# Patient Record
Sex: FEMALE | Race: WHITE | NOT HISPANIC OR LATINO | Employment: FULL TIME | URBAN - METROPOLITAN AREA
[De-identification: names, ages, dates, MRNs, and addresses within clinical notes are randomized per-mention and may not be internally consistent; named-entity substitution may affect disease eponyms.]

---

## 2021-03-03 ENCOUNTER — HOSPITAL ENCOUNTER (OUTPATIENT)
Dept: LAB | Facility: MEDICAL CENTER | Age: 21
End: 2021-03-03
Attending: PHYSICIAN ASSISTANT
Payer: COMMERCIAL

## 2021-03-03 LAB — B-HCG SERPL-ACNC: 3375 MIU/ML (ref 0–5)

## 2021-03-03 PROCEDURE — 36415 COLL VENOUS BLD VENIPUNCTURE: CPT

## 2021-03-03 PROCEDURE — 84702 CHORIONIC GONADOTROPIN TEST: CPT

## 2021-07-08 ENCOUNTER — OFFICE VISIT (OUTPATIENT)
Dept: URGENT CARE | Facility: CLINIC | Age: 21
End: 2021-07-08
Payer: COMMERCIAL

## 2021-07-08 VITALS
TEMPERATURE: 97.9 F | SYSTOLIC BLOOD PRESSURE: 140 MMHG | HEIGHT: 70 IN | HEART RATE: 113 BPM | WEIGHT: 288.1 LBS | BODY MASS INDEX: 41.24 KG/M2 | RESPIRATION RATE: 16 BRPM | DIASTOLIC BLOOD PRESSURE: 78 MMHG | OXYGEN SATURATION: 98 %

## 2021-07-08 DIAGNOSIS — H60.502 ACUTE OTITIS EXTERNA OF LEFT EAR, UNSPECIFIED TYPE: ICD-10-CM

## 2021-07-08 PROBLEM — T83.32XA IUD MIGRATION: Status: ACTIVE | Noted: 2021-03-10

## 2021-07-08 PROBLEM — T83.32XA IUD STRINGS LOST: Status: ACTIVE | Noted: 2021-03-10

## 2021-07-08 PROBLEM — J30.9 ALLERGIC RHINITIS: Status: ACTIVE | Noted: 2021-07-08

## 2021-07-08 PROBLEM — S39.012A STRAIN OF LUMBAR REGION: Status: ACTIVE | Noted: 2019-02-06

## 2021-07-08 PROBLEM — M25.551 RIGHT HIP PAIN: Status: ACTIVE | Noted: 2019-04-24

## 2021-07-08 PROBLEM — S39.012A SACROILIAC STRAIN: Status: ACTIVE | Noted: 2019-02-06

## 2021-07-08 PROCEDURE — 99203 OFFICE O/P NEW LOW 30 MIN: CPT | Performed by: PHYSICIAN ASSISTANT

## 2021-07-08 RX ORDER — COVID-19 MOLECULAR TEST ASSAY
KIT MISCELLANEOUS
COMMUNITY
Start: 2021-02-09 | End: 2021-07-08

## 2021-07-08 RX ORDER — ACETAMINOPHEN AND CODEINE PHOSPHATE 120; 12 MG/5ML; MG/5ML
SOLUTION ORAL
COMMUNITY
Start: 2021-03-10 | End: 2021-07-08

## 2021-07-08 RX ORDER — ACETAMINOPHEN AND CODEINE PHOSPHATE 120; 12 MG/5ML; MG/5ML
0.35 SOLUTION ORAL DAILY
COMMUNITY
Start: 2021-03-10 | End: 2021-07-08

## 2021-07-08 RX ORDER — IBUPROFEN 800 MG/1
800 TABLET ORAL
COMMUNITY
End: 2021-07-08

## 2021-07-08 RX ORDER — BENZONATATE 100 MG/1
100 CAPSULE ORAL
COMMUNITY
Start: 2021-07-06 | End: 2021-07-13

## 2021-07-08 RX ORDER — ESCITALOPRAM OXALATE 20 MG/1
20 TABLET ORAL
COMMUNITY
Start: 2021-05-10 | End: 2021-07-08

## 2021-07-08 RX ORDER — CIPROFLOXACIN AND DEXAMETHASONE 3; 1 MG/ML; MG/ML
SUSPENSION/ DROPS AURICULAR (OTIC)
Qty: 7.5 ML | Refills: 0 | Status: SHIPPED | OUTPATIENT
Start: 2021-07-08 | End: 2021-07-08

## 2021-07-08 RX ORDER — CIPROFLOXACIN AND DEXAMETHASONE 3; 1 MG/ML; MG/ML
SUSPENSION/ DROPS AURICULAR (OTIC)
Qty: 7.5 ML | Refills: 0 | Status: SHIPPED | OUTPATIENT
Start: 2021-07-08 | End: 2021-09-06

## 2021-07-08 RX ORDER — AMOXICILLIN AND CLAVULANATE POTASSIUM 875; 125 MG/1; MG/1
1 TABLET, FILM COATED ORAL
COMMUNITY
Start: 2021-07-06 | End: 2021-09-06

## 2021-07-08 RX ORDER — GUAIFENESIN, PSEUDOEPHEDRINE HYDROCHLORIDE 600; 60 MG/1; MG/1
1 TABLET, EXTENDED RELEASE ORAL
COMMUNITY
Start: 2021-07-06 | End: 2021-07-08

## 2021-07-08 RX ORDER — AMOXICILLIN AND CLAVULANATE POTASSIUM 875; 125 MG/1; MG/1
1 TABLET, FILM COATED ORAL
COMMUNITY
Start: 2021-07-06 | End: 2021-07-08

## 2021-07-08 RX ORDER — BENZONATATE 100 MG/1
CAPSULE ORAL
COMMUNITY
Start: 2021-07-06 | End: 2021-07-08

## 2021-07-08 RX ORDER — ESCITALOPRAM OXALATE 10 MG/1
TABLET ORAL
COMMUNITY
Start: 2021-04-13 | End: 2021-07-08

## 2021-07-08 RX ORDER — GUAIFENESIN AND PSEUDOEPHEDRINE HYDROCHLORIDE 600; 60 MG/1; MG/1
TABLET, EXTENDED RELEASE ORAL
COMMUNITY
Start: 2021-07-06 | End: 2021-09-06

## 2021-07-08 RX ORDER — ESCITALOPRAM OXALATE 20 MG/1
20 TABLET ORAL
COMMUNITY
End: 2021-09-06

## 2021-07-08 ASSESSMENT — ENCOUNTER SYMPTOMS
FEVER: 0
SINUS PAIN: 0
CHILLS: 0
RESPIRATORY NEGATIVE: 1
SORE THROAT: 0
CARDIOVASCULAR NEGATIVE: 1
ABDOMINAL PAIN: 0
DIARRHEA: 0

## 2021-07-08 NOTE — PROGRESS NOTES
"Subjective:   Dilan Pérez is a 20 y.o. female who presents for Otalgia (L ear, X 1 week, down into jaw) and Cough (green phlem X 1 week)      HPI  Patient presents the clinic with complaints of left ear pain onset 1 week ago.  Recently had sinus issues and was seen by telemedicine 2 days ago.  She was placed on Augmentin.  Her sinus issues have resolved and her cough has improved.  She primarily complains about her left ear pain. Associated mild clear drainage. The ear pain is severe.  Ear pain is worse with laying on her left side and chewing.  Denies any fever, chills, chest pain, SOB, abdominal pain, diarrhea. History of ear infection. Was in hot tub 2 days ago.       Review of Systems   Constitutional: Negative for chills and fever.   HENT: Positive for ear discharge and ear pain. Negative for congestion, sinus pain and sore throat.    Respiratory: Negative.    Cardiovascular: Negative.    Gastrointestinal: Negative for abdominal pain and diarrhea.       Medications:    • amoxicillin-clavulanate Tabs  • benzonatate Caps  • escitalopram  • escitalopram Tabs  • ibuprofen Tabs  • ID Now COVID-19 Kit  • Mucinex D Tb12  • norethindrone  • pseudoephedrine-guaifenesin    Allergies: Patient has no known allergies.    Problem List: Dilan Pérez does not have a problem list on file.    Surgical History:  No past surgical history on file.    Past Social Hx: Dilan Pérez  reports that she has never smoked. She has never used smokeless tobacco. She reports that she does not drink alcohol and does not use drugs.     Past Family Hx:  Dilan Pérez family history is not on file.     Problem list, medications, and allergies reviewed by myself today in Epic.     Objective:     /78 (BP Location: Left arm, Patient Position: Sitting, BP Cuff Size: Adult)   Pulse (!) 113   Temp 36.6 °C (97.9 °F) (Temporal)   Resp 16   Ht 1.778 m (5' 10\")   Wt (!) 131 kg (288 lb 1.6 oz)   LMP 07/03/2021   " SpO2 98%   BMI 41.34 kg/m²     Physical Exam  Vitals reviewed.   Constitutional:       General: She is not in acute distress.     Appearance: Normal appearance. She is not ill-appearing or toxic-appearing.   HENT:      Right Ear: Tympanic membrane, ear canal and external ear normal. No mastoid tenderness.      Left Ear: No mastoid tenderness.      Ears:      Comments: Left ear canal edematous, mildly erythematous, mild drainage.  Positive tragal tenderness     Mouth/Throat:      Mouth: Mucous membranes are moist.      Pharynx: No oropharyngeal exudate or posterior oropharyngeal erythema.   Eyes:      Conjunctiva/sclera: Conjunctivae normal.      Pupils: Pupils are equal, round, and reactive to light.   Cardiovascular:      Rate and Rhythm: Normal rate and regular rhythm.      Heart sounds: Normal heart sounds.   Pulmonary:      Effort: Pulmonary effort is normal. No respiratory distress.      Breath sounds: Normal breath sounds. No wheezing, rhonchi or rales.   Musculoskeletal:      Cervical back: Neck supple.   Lymphadenopathy:      Cervical: No cervical adenopathy.   Skin:     General: Skin is warm and dry.   Neurological:      General: No focal deficit present.      Mental Status: She is alert and oriented to person, place, and time.   Psychiatric:         Mood and Affect: Mood normal.         Behavior: Behavior normal.         Assessment/Associated Orders     1. Acute otitis externa of left ear, unspecified type  ciprofloxacin/dexamethasone (CIPRODEX) 0.3-0.1 % Suspension    DISCONTINUED: ciprofloxacin/dexamethasone (CIPRODEX) 0.3-0.1 % Suspension       Medical Decision Making      Discussed with patient signs and symptoms are consistent with left otitis externa.  Discussed treatment of antibiotic eardrops.  Ibuprofen for pain.  Avoid exacerbating factors. Avoid pools and hot tubs. AVS printed.     Return if symptoms not improving in the next few days or any worsening.    I personally reviewed prior external  notes and test results pertinent to today's visit. Supportive care, natural history, differential diagnoses, and indications for immediate follow-up discussed. Patient expresses understanding and agrees to plan. Patient denies any other questions or concerns.     Advised the patient to follow-up with the primary care physician for recheck, reevaluation, and consideration of further management.    Please note that this dictation was created using voice recognition software. I have made a reasonable attempt to correct obvious errors, but I expect that there are errors of grammar and possibly content that I did not discover before finalizing the note.    This note was electronically signed by Vladislav Rice PA-C

## 2021-07-09 ENCOUNTER — HOSPITAL ENCOUNTER (EMERGENCY)
Facility: MEDICAL CENTER | Age: 21
End: 2021-07-10
Payer: COMMERCIAL

## 2021-07-09 ENCOUNTER — TELEPHONE (OUTPATIENT)
Dept: URGENT CARE | Facility: PHYSICIAN GROUP | Age: 21
End: 2021-07-09

## 2021-07-09 ENCOUNTER — TELEPHONE (OUTPATIENT)
Dept: URGENT CARE | Facility: CLINIC | Age: 21
End: 2021-07-09

## 2021-07-09 PROCEDURE — 302449 STATCHG TRIAGE ONLY (STATISTIC)

## 2021-07-09 NOTE — TELEPHONE ENCOUNTER
Good afternoon,  Patient just called and stated that she is still in a lot of pain and she is not able to sleep. She said that she has been doing everything you have told her and nothing seems to help. She is asking for a different prescription or if there is something different she can do

## 2021-07-10 VITALS
DIASTOLIC BLOOD PRESSURE: 91 MMHG | OXYGEN SATURATION: 97 % | SYSTOLIC BLOOD PRESSURE: 150 MMHG | HEART RATE: 108 BPM | RESPIRATION RATE: 18 BRPM | BODY MASS INDEX: 41.66 KG/M2 | TEMPERATURE: 98.6 F | HEIGHT: 70 IN | WEIGHT: 291.01 LBS

## 2021-07-10 NOTE — ED TRIAGE NOTES
Chief Complaint   Patient presents with   • Ear Pain     left ear pain x6 days; pt saw [facility] 4 days ago and was put on antibiotics (Augmentin); pt went to  last night and was given drops as well     Pt ambulatory to triage with boyfriend for above complaint. Pt states that she was having drainage from her left ear with some blood in it last night, which is why she went to . Staff there told her that if it isn't better by today then she needed to come to the ER. Pain has actually gotten worse.     Pt is alert/oriented and follows commands. Pt speaking in full sentences and responds appropriately to questions. No acute distress noted in triage and respirations are even and unlabored.     Pt placed in lobby and educated on triage process. Pt and boyfriend encouraged to alert staff for any changes in condition.

## 2021-07-10 NOTE — TELEPHONE ENCOUNTER
Spoke to the patient on the phone addressing her concerns. She has continued severe ear pain. Recommended ibuprofen 800 mg p.o. every 8 hours as needed for severe pain. She may apply ice application. Discussed antibiotic eardrops need more time to take effect. Continue eardrops as prescribed. Avoid sleeping laying on affected ear. Return to the urgent care if no improvement or any worsening. Patient verbalized understanding agreed to plan of care.

## 2021-09-06 ENCOUNTER — OFFICE VISIT (OUTPATIENT)
Dept: URGENT CARE | Facility: PHYSICIAN GROUP | Age: 21
End: 2021-09-06
Payer: COMMERCIAL

## 2021-09-06 ENCOUNTER — HOSPITAL ENCOUNTER (OUTPATIENT)
Facility: MEDICAL CENTER | Age: 21
End: 2021-09-06
Attending: NURSE PRACTITIONER
Payer: COMMERCIAL

## 2021-09-06 VITALS
SYSTOLIC BLOOD PRESSURE: 116 MMHG | BODY MASS INDEX: 35.79 KG/M2 | RESPIRATION RATE: 16 BRPM | OXYGEN SATURATION: 100 % | HEART RATE: 88 BPM | WEIGHT: 250 LBS | HEIGHT: 70 IN | DIASTOLIC BLOOD PRESSURE: 66 MMHG | TEMPERATURE: 97.4 F

## 2021-09-06 DIAGNOSIS — R05.9 COUGH: ICD-10-CM

## 2021-09-06 DIAGNOSIS — R51.9 ACUTE NONINTRACTABLE HEADACHE, UNSPECIFIED HEADACHE TYPE: ICD-10-CM

## 2021-09-06 DIAGNOSIS — J02.9 SORE THROAT: ICD-10-CM

## 2021-09-06 LAB
INT CON NEG: NEGATIVE
INT CON POS: POSITIVE
S PYO AG THROAT QL: NORMAL

## 2021-09-06 PROCEDURE — 87880 STREP A ASSAY W/OPTIC: CPT | Performed by: NURSE PRACTITIONER

## 2021-09-06 PROCEDURE — U0005 INFEC AGEN DETEC AMPLI PROBE: HCPCS

## 2021-09-06 PROCEDURE — U0003 INFECTIOUS AGENT DETECTION BY NUCLEIC ACID (DNA OR RNA); SEVERE ACUTE RESPIRATORY SYNDROME CORONAVIRUS 2 (SARS-COV-2) (CORONAVIRUS DISEASE [COVID-19]), AMPLIFIED PROBE TECHNIQUE, MAKING USE OF HIGH THROUGHPUT TECHNOLOGIES AS DESCRIBED BY CMS-2020-01-R: HCPCS

## 2021-09-06 PROCEDURE — 99213 OFFICE O/P EST LOW 20 MIN: CPT | Performed by: NURSE PRACTITIONER

## 2021-09-06 NOTE — LETTER
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  A concern for COVID-19 was identified and testing was done.  We are asking that you excuse work absences while following the self-isolation protocol per the Center for Disease Control (CDC) guidelines.  Your employee is able to access the test results through [facility]'s electronic delivery system called [employer].   If the results of testing were negative, and once there has been no fever(temperature greater than 100.4 °F) for at least 48 hours without taking any acetaminophen or ibuprofen, and no vomiting or diarrhea for at least 48 hours, then return to work is approved without restrictions.  If the results of the testing were positive follow the the CDC guidelines with a minimum of 10 days from the onset of symptoms and without fever, vomiting, or diarrhea.    In general repeat testing is not necessary and not offered through [facility].  Repeat testing is also not recommended by the CDC.  This is the only note that will be provided from the On license of [facility] for this illness.       HEAVEN Owens  Electronically Signed

## 2021-09-07 DIAGNOSIS — R05.9 COUGH: ICD-10-CM

## 2021-09-07 DIAGNOSIS — J02.9 SORE THROAT: ICD-10-CM

## 2021-09-07 DIAGNOSIS — R51.9 ACUTE NONINTRACTABLE HEADACHE, UNSPECIFIED HEADACHE TYPE: ICD-10-CM

## 2021-09-07 LAB
COVID ORDER STATUS COVID19: NORMAL
SARS-COV-2 RNA RESP QL NAA+PROBE: NOTDETECTED
SPECIMEN SOURCE: NORMAL

## 2021-09-14 ENCOUNTER — NON-PROVIDER VISIT (OUTPATIENT)
Dept: MEDICAL GROUP | Facility: PHYSICIAN GROUP | Age: 21
End: 2021-09-14

## 2021-09-14 DIAGNOSIS — Z11.1 PPD SCREENING TEST: ICD-10-CM

## 2021-09-14 PROCEDURE — 86580 TB INTRADERMAL TEST: CPT | Performed by: NURSE PRACTITIONER

## 2021-09-14 NOTE — NON-PROVIDER
Dilan Pérez is a 21 y.o. female here for a non-provider visit for PPD placement -- Step 1 of 1    Reason for PPD:  work requirement    1. TB evaluation questionnaire completed by patient? Yes      -  If any answers marked yes did you contact a provider prior to placing? No  2.  Patient notified to return to clinic for reading on: 9/16  3.  PPD Placement documentation completed on TB evaluation questionnaire? Yes  4.  Location of TB evaluation questionnaire filed: front office

## 2021-09-15 ASSESSMENT — LIFESTYLE VARIABLES: SUBSTANCE_ABUSE: 0

## 2021-09-15 ASSESSMENT — ENCOUNTER SYMPTOMS
COUGH: 1
FEVER: 1
TINGLING: 0
DIZZINESS: 0
CHILLS: 0
SORE THROAT: 1
NAUSEA: 0
MYALGIAS: 0
HEADACHES: 1
PALPITATIONS: 0
SINUS PAIN: 0
SENSORY CHANGE: 0

## 2021-09-15 NOTE — PROGRESS NOTES
Dilan Pérez is a 21 y.o. female who presents for Pharyngitis (x1 week ) and Cough (Cough, fever, chills stuffy nose, sore throat)      HPI This is a new problem.  Dilan is a 21-year-old female presents with sore throat and cough for 1 week.  Associated symptoms include fever, chills, stuffy nose, sore throat, fatigue.  Her appetite is less than normal.  She has no change in her taste or smell.  She has no known exposure to persons with Covid or strep infection.  She is vaccinated against COVID-19 viral illness.  She reports that due to her symptoms she went to be tested for possible Covid infection.  Her headache is generalized.  It is tolerable with Tylenol and ibuprofen.  She has no history of migraine headaches.  She denies focal weakness or vision change.  No other aggravating or alleviating factors.    Review of Systems   Constitutional: Positive for fever (subjective). Negative for chills and malaise/fatigue.   HENT: Positive for congestion and sore throat. Negative for ear pain and sinus pain.    Respiratory: Positive for cough.    Cardiovascular: Negative for chest pain and palpitations.   Gastrointestinal: Negative for nausea.   Musculoskeletal: Negative for myalgias.   Neurological: Positive for headaches. Negative for dizziness, tingling and sensory change.   Psychiatric/Behavioral: Negative for substance abuse.       No Known Allergies  Past Medical History:   Diagnosis Date   • Anxiety     on meds   • Depression     on meds     History reviewed. No pertinent surgical history.  History reviewed. No pertinent family history.  Social History     Tobacco Use   • Smoking status: Never Smoker   • Smokeless tobacco: Never Used   Substance Use Topics   • Alcohol use: Never     Patient Active Problem List   Diagnosis   • Allergic rhinitis   • IUD migration   • IUD strings lost   • Right hip pain   • Sacroiliac strain   • Strain of lumbar region     No current outpatient medications on file prior to  "visit.     No current facility-administered medications on file prior to visit.          Objective:     /66 (BP Location: Left arm, Patient Position: Sitting, BP Cuff Size: Adult)   Pulse 88   Temp 36.3 °C (97.4 °F) (Temporal)   Resp 16   Ht 1.778 m (5' 10\")   Wt 113 kg (250 lb)   SpO2 100%   BMI 35.87 kg/m²     Physical Exam  Vitals and nursing note reviewed.   Constitutional:       General: She is not in acute distress.     Appearance: Normal appearance. She is well-developed and normal weight. She is not ill-appearing or toxic-appearing.   HENT:      Head: Normocephalic.      Right Ear: Hearing, ear canal and external ear normal. No middle ear effusion. Tympanic membrane is injected. Tympanic membrane is not erythematous.      Left Ear: Hearing, ear canal and external ear normal.  No middle ear effusion. Tympanic membrane is injected. Tympanic membrane is not erythematous.      Nose: Rhinorrhea present. No mucosal edema.      Right Sinus: No maxillary sinus tenderness or frontal sinus tenderness.      Left Sinus: No maxillary sinus tenderness or frontal sinus tenderness.      Mouth/Throat:      Pharynx: Uvula midline. Posterior oropharyngeal erythema present. No oropharyngeal exudate.      Tonsils: No tonsillar abscesses.   Eyes:      Pupils: Pupils are equal, round, and reactive to light.   Neck:      Trachea: Trachea normal.   Cardiovascular:      Rate and Rhythm: Normal rate and regular rhythm.      Chest Wall: PMI is not displaced.      Pulses: Normal pulses.   Pulmonary:      Effort: Pulmonary effort is normal. No respiratory distress.      Breath sounds: Normal breath sounds. No decreased breath sounds, wheezing, rhonchi or rales.   Abdominal:      Palpations: Abdomen is soft.      Tenderness: There is no abdominal tenderness.   Musculoskeletal:         General: Normal range of motion.      Cervical back: Full passive range of motion without pain, normal range of motion and neck supple. "   Lymphadenopathy:      Cervical: No cervical adenopathy.      Upper Body:      Right upper body: No supraclavicular adenopathy.      Left upper body: No supraclavicular adenopathy.   Skin:     General: Skin is warm and dry.      Capillary Refill: Capillary refill takes less than 2 seconds.   Neurological:      Mental Status: She is alert and oriented to person, place, and time.      Gait: Gait normal.   Psychiatric:         Mood and Affect: Mood normal.         Speech: Speech normal.         Behavior: Behavior normal.         Thought Content: Thought content normal.     Rapid Strep A : negative      Assessment /Associated Orders:      1. Sore throat  POCT Rapid Strep A    SARS-CoV-2 PCR (24 hour In-House): Collect NP swab in VTM   2. Cough  POCT Rapid Strep A    SARS-CoV-2 PCR (24 hour In-House): Collect NP swab in VTM   3. Acute nonintractable headache, unspecified headache type  POCT Rapid Strep A    SARS-CoV-2 PCR (24 hour In-House): Collect NP swab in VTM       Medical Decision Making:    Pt is clinically stable at today's acute urgent care visit.  No acute distress noted. Appropriate for outpatient management at this time.     COVID PCR testing - pending- (results to be released to [facility] if available)   Self Quarantine per CDC guidelines  Educated in infection control practices.   Discussed that this illness was most likely viral in nature. Did not see any evidence of a bacterial process.   OTC  analgesic/ antipyretic of choice ( acetaminophen or NSAID). Follow manufactures dosing and safety precautions.   OTC medications for symptomatic relief of symptoms  Keep well hydrated  Increase rest  Salt water gargles BID and prn. Suggested 1/4 to 1/2 teaspoon (1.5 to 3.0 g) of salt per one cup (8 ounces or 250 mL) of warm water.   OTC throat analgesic spray or lozenge of choice prn throat pain. Dosage and directions per         Advised to follow-up with the primary care provider  for recheck,  reevaluation, and consideration of further management if necessary.   Discussed management options (risks,benefits, and alternatives to treatment). Pt/ caregiver expressed understanding and the treatment plan was agreed upon. Questions were encouraged and answered     Return to urgent care prn if new or worsening sx or if there is no improvement in condition prn . Red flags discussed and indications to immediately call 911 or present to the Emergency Department.     I personally reviewed prior external notes and test results pertinent to today's visit.  I have independently reviewed and interpreted all diagnostics ordered during this urgent care acute visit.   Time spent evaluating this patient was at least 30 minutes and includes preparing for visit, counseling/education, exam and evaluation, obtaining history, independent interpretation, ordering lab/test/procedures,medication management and documentation.This does not include time spent on separate billable procedures.           Please note that this dictation was created using voice recognition software. I have made a reasonable attempt to correct obvious errors, but I expect that there are errors of grammar and possibly content that I did not discover before finalizing the note.    This note was electronically signed by Jessica QUEZADA, Urgent Care

## 2021-09-17 ENCOUNTER — NON-PROVIDER VISIT (OUTPATIENT)
Dept: MEDICAL GROUP | Facility: PHYSICIAN GROUP | Age: 21
End: 2021-09-17

## 2021-09-17 LAB — TB WHEAL 3D P 5 TU DIAM: 0 MM

## 2021-09-17 NOTE — PROGRESS NOTES
Dilan Pérez is a 21 y.o. female here for a non-provider visit for PPD reading -- Step 1 of 1.      1.  Resulted in Epic under enter/edit results? Yes   2.  TB evaluation questionnaire scanned into chart and original given to patient?Yes      3. Was induration greater than 0 mm? No.

## 2022-03-22 ENCOUNTER — OFFICE VISIT (OUTPATIENT)
Dept: URGENT CARE | Facility: PHYSICIAN GROUP | Age: 22
End: 2022-03-22
Payer: COMMERCIAL

## 2022-03-22 VITALS
OXYGEN SATURATION: 98 % | SYSTOLIC BLOOD PRESSURE: 136 MMHG | TEMPERATURE: 97.7 F | WEIGHT: 266 LBS | HEART RATE: 92 BPM | HEIGHT: 69 IN | RESPIRATION RATE: 20 BRPM | BODY MASS INDEX: 39.4 KG/M2 | DIASTOLIC BLOOD PRESSURE: 82 MMHG

## 2022-03-22 DIAGNOSIS — N92.6 LATE MENSES: ICD-10-CM

## 2022-03-22 LAB
APPEARANCE UR: NORMAL
BILIRUB UR STRIP-MCNC: NEGATIVE MG/DL
COLOR UR AUTO: YELLOW
GLUCOSE UR STRIP.AUTO-MCNC: NEGATIVE MG/DL
INT CON NEG: NEGATIVE
INT CON POS: POSITIVE
KETONES UR STRIP.AUTO-MCNC: NORMAL MG/DL
LEUKOCYTE ESTERASE UR QL STRIP.AUTO: NEGATIVE
NITRITE UR QL STRIP.AUTO: NEGATIVE
PH UR STRIP.AUTO: 5.5 [PH] (ref 5–8)
POC URINE PREGNANCY TEST: NEGATIVE
PROT UR QL STRIP: NEGATIVE MG/DL
RBC UR QL AUTO: NORMAL
SP GR UR STRIP.AUTO: 1.02
UROBILINOGEN UR STRIP-MCNC: 0.2 MG/DL

## 2022-03-22 PROCEDURE — 99213 OFFICE O/P EST LOW 20 MIN: CPT | Performed by: FAMILY MEDICINE

## 2022-03-22 PROCEDURE — 81002 URINALYSIS NONAUTO W/O SCOPE: CPT | Performed by: FAMILY MEDICINE

## 2022-03-22 PROCEDURE — 81025 URINE PREGNANCY TEST: CPT | Performed by: FAMILY MEDICINE

## 2022-03-22 NOTE — PROGRESS NOTES
"  Subjective:      21 y.o. female presents to urgent care for late menstruation. She was supposed to begin her menstrual cycle 3/20/2022.  Although she does admit to having an irregular cycle.  She is currently sexually active with one, male partner, and they use condoms fairly consistently.  She did have a copper IUD in at one point, but became pregnant on it, which she unfortunately miscarried.  She did note some brown discharge this morning, which typically occurs towards the end of her period.  She is actively trying to lose weight and is down 30 pounds since January.     She denies any other questions or concerns at this time.    Current problem list, medication, and past medical/surgical history were reviewed in Epic.    ROS  See HPI     Objective:      /82 (BP Location: Left arm, Patient Position: Sitting, BP Cuff Size: Adult long)   Pulse 92   Temp 36.5 °C (97.7 °F) (Temporal)   Resp 20   Ht 1.753 m (5' 9\")   Wt 121 kg (266 lb)   SpO2 98%   BMI 39.28 kg/m²     Physical Exam  Constitutional:       General: She is not in acute distress.     Appearance: She is not diaphoretic.   Cardiovascular:      Rate and Rhythm: Normal rate and regular rhythm.      Heart sounds: Normal heart sounds.   Pulmonary:      Effort: Pulmonary effort is normal. No respiratory distress.      Breath sounds: Normal breath sounds.   Abdominal:      General: Bowel sounds are normal.      Palpations: Abdomen is soft.      Tenderness: There is no abdominal tenderness. There is no right CVA tenderness or left CVA tenderness.   Neurological:      Mental Status: She is alert.   Psychiatric:         Mood and Affect: Affect normal.         Judgment: Judgment normal.       Assessment/Plan:     1. Late menses  hCG negative.  No sign of infection on urinalysis.  Brown discharge is likely the beginning of her menstruation.  She is aware that weight loss can make her menstruation irregular.  - POCT Pregnancy  - POCT " Urinalysis      Instructed to return to Urgent Care or nearest Emergency Department if symptoms fail to improve, for any change in condition, further concerns, or new concerning symptoms. Patient states understanding of the plan of care and discharge instructions.    Mona Trujillo M.D.

## 2022-03-22 NOTE — LETTER
March 22, 2022    To Whom It May Concern:         This is confirmation that Dilan Pérez attended her scheduled appointment with Mona Trujillo M.D. on 3/22/22.  She may return 3/24/2022 without any restrictions.         If you have any questions please do not hesitate to call me at the phone number listed below.    Sincerely,          Mona Trujillo M.D.  [phone]

## 2022-07-27 ENCOUNTER — HOSPITAL ENCOUNTER (OUTPATIENT)
Facility: MEDICAL CENTER | Age: 22
End: 2022-07-27
Attending: PHYSICIAN ASSISTANT
Payer: COMMERCIAL

## 2022-07-27 ENCOUNTER — OFFICE VISIT (OUTPATIENT)
Dept: URGENT CARE | Facility: PHYSICIAN GROUP | Age: 22
End: 2022-07-27
Payer: COMMERCIAL

## 2022-07-27 VITALS
HEIGHT: 70 IN | TEMPERATURE: 98.4 F | BODY MASS INDEX: 36.36 KG/M2 | RESPIRATION RATE: 16 BRPM | OXYGEN SATURATION: 98 % | HEART RATE: 108 BPM | DIASTOLIC BLOOD PRESSURE: 60 MMHG | WEIGHT: 254 LBS | SYSTOLIC BLOOD PRESSURE: 118 MMHG

## 2022-07-27 DIAGNOSIS — J02.9 SORE THROAT: ICD-10-CM

## 2022-07-27 DIAGNOSIS — Z20.828 EXPOSURE TO THE FLU: ICD-10-CM

## 2022-07-27 DIAGNOSIS — B34.9 NONSPECIFIC SYNDROME SUGGESTIVE OF VIRAL ILLNESS: ICD-10-CM

## 2022-07-27 LAB
EXTERNAL QUALITY CONTROL: NORMAL
FLUAV+FLUBV AG SPEC QL IA: NEGATIVE
INT CON NEG: NORMAL
INT CON NEG: NORMAL
INT CON POS: NORMAL
INT CON POS: NORMAL
S PYO AG THROAT QL: NEGATIVE
SARS-COV+SARS-COV-2 AG RESP QL IA.RAPID: NEGATIVE

## 2022-07-27 PROCEDURE — U0005 INFEC AGEN DETEC AMPLI PROBE: HCPCS

## 2022-07-27 PROCEDURE — U0003 INFECTIOUS AGENT DETECTION BY NUCLEIC ACID (DNA OR RNA); SEVERE ACUTE RESPIRATORY SYNDROME CORONAVIRUS 2 (SARS-COV-2) (CORONAVIRUS DISEASE [COVID-19]), AMPLIFIED PROBE TECHNIQUE, MAKING USE OF HIGH THROUGHPUT TECHNOLOGIES AS DESCRIBED BY CMS-2020-01-R: HCPCS

## 2022-07-27 PROCEDURE — 99214 OFFICE O/P EST MOD 30 MIN: CPT | Mod: CS | Performed by: PHYSICIAN ASSISTANT

## 2022-07-27 PROCEDURE — 87804 INFLUENZA ASSAY W/OPTIC: CPT | Performed by: PHYSICIAN ASSISTANT

## 2022-07-27 PROCEDURE — 87426 SARSCOV CORONAVIRUS AG IA: CPT | Performed by: PHYSICIAN ASSISTANT

## 2022-07-27 PROCEDURE — 87880 STREP A ASSAY W/OPTIC: CPT | Performed by: PHYSICIAN ASSISTANT

## 2022-07-27 RX ORDER — DEXAMETHASONE SODIUM PHOSPHATE 10 MG/ML
10 INJECTION INTRAMUSCULAR; INTRAVENOUS ONCE
Status: COMPLETED | OUTPATIENT
Start: 2022-07-27 | End: 2022-07-27

## 2022-07-27 RX ADMIN — DEXAMETHASONE SODIUM PHOSPHATE 10 MG: 10 INJECTION INTRAMUSCULAR; INTRAVENOUS at 12:27

## 2022-07-27 ASSESSMENT — ENCOUNTER SYMPTOMS
SHORTNESS OF BREATH: 0
DIARRHEA: 1
HOARSE VOICE: 1
COUGH: 1
VOMITING: 0
SWOLLEN GLANDS: 1
HEADACHES: 1

## 2022-07-27 NOTE — PROGRESS NOTES
"Subjective:   Dilan Pérez is a 21 y.o. female who presents for Pharyngitis (Runny nose, cough, headache x3 days )        Pharyngitis   This is a new problem. Episode onset: 3 days. The problem has been gradually worsening. The maximum temperature recorded prior to her arrival was 100.4 - 100.9 F. The fever has been present for 1 to 2 days. The pain is moderate. Associated symptoms include congestion, coughing, diarrhea (loose stools), headaches, a hoarse voice and swollen glands. Pertinent negatives include no shortness of breath or vomiting. Associated symptoms comments: Pain with swallowing, body aches. She has had no exposure to strep or mono. 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 Treatments tried: sudafed. The treatment provided no relief.     Review of Systems   HENT: Positive for congestion and hoarse voice.    Respiratory: Positive for cough. Negative for shortness of breath.    Gastrointestinal: Positive for diarrhea (loose stools). Negative for vomiting.   Neurological: Positive for headaches.       PMH:  has a past medical history of Anxiety and Depression.  MEDS: No current outpatient medications on file.    Current Facility-Administered Medications:   •  dexamethasone (DECADRON) injection (check route below) 10 mg, 10 mg, Oral, Once, MENDOZA Green.A.-TYESHA.  ALLERGIES: No Known Allergies  SURGHX: No past surgical history on file.  SOCHX:  reports that she has never smoked. She has never used smokeless tobacco. She reports that she does not drink alcohol and does not use drugs.  FH: Family history was reviewed, no pertinent findings to report   Objective:   /60   Pulse (!) 108   Temp 36.9 °C (98.4 °F) (Temporal)   Resp 16   Ht 1.778 m (5' 10\")   Wt 115 kg (254 lb)   SpO2 98%   BMI 36.45 kg/m²   Physical Exam  Vitals reviewed.   Constitutional:       General: She is not in acute distress.     Appearance: Normal appearance. She is well-developed. She is not toxic-appearing.   HENT:      " Head: Normocephalic and atraumatic.      Right Ear: Tympanic membrane, ear canal and external ear normal.      Left Ear: Tympanic membrane, ear canal and external ear normal.      Nose: Congestion and rhinorrhea present. Rhinorrhea is clear.      Mouth/Throat:      Lips: Pink.      Mouth: Mucous membranes are moist.      Pharynx: Oropharynx is clear. Uvula midline. Posterior oropharyngeal erythema present.   Cardiovascular:      Rate and Rhythm: Normal rate and regular rhythm.      Heart sounds: Normal heart sounds, S1 normal and S2 normal.   Pulmonary:      Effort: Pulmonary effort is normal. No respiratory distress.      Breath sounds: Normal breath sounds. No stridor. No decreased breath sounds, wheezing, rhonchi or rales.   Lymphadenopathy:      Cervical: Cervical adenopathy present.      Right cervical: Superficial cervical adenopathy present.      Left cervical: Superficial cervical adenopathy present.   Skin:     General: Skin is dry.   Neurological:      Comments: Alert and oriented.    Psychiatric:         Speech: Speech normal.         Behavior: Behavior normal.           Assessment/Plan:   1. Nonspecific syndrome suggestive of viral illness  - SARS-CoV-2 PCR (24 hour In-House): Collect NP swab in VTM; Future    2. Exposure to the flu  - POCT SARS-COV Antigen MEGGAN (Symptomatic only)  - POCT Influenza A/B  - POCT Rapid Strep A    3. Sore throat  - POCT SARS-COV Antigen MEGGAN (Symptomatic only)  - POCT Influenza A/B  - POCT Rapid Strep A  - dexamethasone (DECADRON) injection (check route below) 10 mg      POC Strep, Covid 19, and influenza testing are negative. Discussed with patient signs and symptoms most likely are due to a viral etiology.  As a precaution we will send out for COVID-19 testing via PCR.  Patient was instructed to quarantine in accordance with CDC guidelines.  Single dose of Decadron administered in clinic for sore throat.    Symptomatic and supportive care:   Plenty of oral hydration and rest    Over the counter cough suppressant as directed.  Tylenol or ibuprofen for pain and fever as directed.   Saline nasal spray and Flonase as a decongestant.     Return to clinic for reevaluation with any new or worsening symptoms.    Differential diagnosis, natural history, supportive care, and indications for immediate follow-up discussed.    My total time spent caring for the patient on the day of the encounter was 32 minutes.   This does not include time spent on separately billable procedures/tests.

## 2022-07-27 NOTE — LETTER
July 27, 2022    To Whom It May Concern:         This is confirmation that Dilan Pérez attended her scheduled appointment with Peewee Moreira P.A.-C. on 7/27/22.  He is excuse her from work on 7/27 through 7/29/2022.         If you have any questions please do not hesitate to call me at the phone number listed below.    Sincerely,          Peewee Moreira P.A.-C.  [phone]

## 2022-07-28 DIAGNOSIS — B34.9 NONSPECIFIC SYNDROME SUGGESTIVE OF VIRAL ILLNESS: ICD-10-CM

## 2022-07-28 LAB
AMBIGUOUS DTTM AMBI4: NORMAL
COVID ORDER STATUS COVID19: NORMAL
SARS-COV-2 RNA RESP QL NAA+PROBE: NOTDETECTED
SPECIMEN SOURCE: NORMAL

## 2022-08-03 ENCOUNTER — OFFICE VISIT (OUTPATIENT)
Dept: URGENT CARE | Facility: PHYSICIAN GROUP | Age: 22
End: 2022-08-03
Payer: COMMERCIAL

## 2022-08-03 VITALS
WEIGHT: 250 LBS | TEMPERATURE: 97.2 F | HEIGHT: 70 IN | OXYGEN SATURATION: 98 % | RESPIRATION RATE: 18 BRPM | BODY MASS INDEX: 35.79 KG/M2 | DIASTOLIC BLOOD PRESSURE: 60 MMHG | HEART RATE: 98 BPM | SYSTOLIC BLOOD PRESSURE: 128 MMHG

## 2022-08-03 DIAGNOSIS — J02.9 PHARYNGITIS, UNSPECIFIED ETIOLOGY: ICD-10-CM

## 2022-08-03 DIAGNOSIS — R13.10 ODYNOPHAGIA: ICD-10-CM

## 2022-08-03 DIAGNOSIS — J30.89 ALLERGIC RHINITIS DUE TO OTHER ALLERGIC TRIGGER, UNSPECIFIED SEASONALITY: ICD-10-CM

## 2022-08-03 PROCEDURE — 99213 OFFICE O/P EST LOW 20 MIN: CPT | Performed by: FAMILY MEDICINE

## 2022-08-03 RX ORDER — DEXAMETHASONE 4 MG/1
10 TABLET ORAL ONCE
Qty: 3 TABLET | Refills: 0 | Status: SHIPPED | OUTPATIENT
Start: 2022-08-03 | End: 2022-08-03

## 2022-08-03 ASSESSMENT — ENCOUNTER SYMPTOMS
MYALGIAS: 0
DIZZINESS: 0
SHORTNESS OF BREATH: 0
NAUSEA: 0
FEVER: 0
CHILLS: 0
VOMITING: 0
SINUS PAIN: 1
COUGH: 0
SORE THROAT: 0
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
## 2022-08-03 NOTE — PATIENT INSTRUCTIONS
Allergic Rhinitis, Adult  Allergic rhinitis is a reaction to allergens in the air. Allergens are tiny specks (particles) in the air that cause your body to have an allergic reaction. This condition cannot be passed from person to person (is not contagious). Allergic rhinitis cannot be cured, but it can be controlled.  There are two types of allergic rhinitis:  Seasonal. This type is also called hay fever. It happens only during certain times of the year.  Perennial. This type can happen at any time of the year.  What are the causes?  This condition may be caused by:  Pollen from grasses, trees, and weeds.  House dust mites.  Pet dander.  Mold.  What are the signs or symptoms?  Symptoms of this condition include:  Sneezing.  Runny or stuffy nose (nasal congestion).  A lot of mucus in the back of the throat (postnasal drip).  Itchy nose.  Tearing of the eyes.  Trouble sleeping.  Being sleepy during day.  How is this treated?  There is no cure for this condition. You should avoid things that trigger your symptoms (allergens). Treatment can help to relieve symptoms. This may include:  Medicines that block allergy symptoms, such as antihistamines. These may be given as a shot, nasal spray, or pill.  Shots that are given until your body becomes less sensitive to the allergen (desensitization).  Stronger medicines, if all other treatments have not worked.  Follow these instructions at home:  Avoiding allergens    Find out what you are allergic to. Common allergens include smoke, dust, and pollen.  Avoid them if you can. These are some of the things that you can do to avoid allergens:  Replace carpet with wood, tile, or vinyl maurisio. Carpet can trap dander and dust.  Clean any mold found in the home.  Do not smoke. Do not allow smoking in your home.  Change your heating and air conditioning filter at least once a month.  During allergy season:  Keep windows closed as much as you can. If possible, use air conditioning when  there is a lot of pollen in the air.  Use a special filter for allergies with your furnace and air conditioner.  Plan outdoor activities when pollen counts are lowest. This is usually during the early morning or evening hours.  If you do go outdoors when pollen count is high, wear a special mask for people with allergies.  When you come indoors, take a shower and change your clothes before sitting on furniture or bedding.  General instructions  Do not use fans in your home.  Do not hang clothes outside to dry.  Wear sunglasses to keep pollen out of your eyes.  Wash your hands right away after you touch household pets.  Take over-the-counter and prescription medicines only as told by your doctor.  Keep all follow-up visits as told by your doctor. This is important.  Contact a doctor if:  You have a fever.  You have a cough that does not go away (is persistent).  You start to make whistling sounds when you breathe (wheeze).  Your symptoms do not get better with treatment.  You have thick fluid coming from your nose.  You start to have nosebleeds.  Get help right away if:  Your tongue or your lips are swollen.  You have trouble breathing.  You feel dizzy or you feel like you are going to pass out (faint).  You have cold sweats.  Summary  Allergic rhinitis is a reaction to allergens in the air.  This condition may be caused by allergens. These include pollen, dust mites, pet dander, and mold.  Symptoms include a runny, itchy nose, sneezing, or tearing eyes. You may also have trouble sleeping or feel sleepy during the day.  Treatment includes taking medicines and avoiding allergens. You may also get shots or take stronger medicines.  Get help if you have a fever or a cough that does not stop. Get help right away if you are short of breath.  This information is not intended to replace advice given to you by your health care provider. Make sure you discuss any questions you have with your health care provider.  Document  Released: 04/18/2012 Document Revised: 04/07/2020 Document Reviewed: 07/09/2019  Elsevier Patient Education © 2020 [employer]  Pharyngitis    Pharyngitis is a sore throat (pharynx). This is when there is redness, pain, and swelling in your throat. Most of the time, this condition gets better on its own. In some cases, you may need medicine.  Follow these instructions at home:  Take over-the-counter and prescription medicines only as told by your doctor.  If you were prescribed an antibiotic medicine, take it as told by your doctor. Do not stop taking the antibiotic even if you start to feel better.  Do not give children aspirin. Aspirin has been linked to Reye syndrome.  Drink enough water and fluids to keep your pee (urine) clear or pale yellow.  Get a lot of rest.  Rinse your mouth (gargle) with a salt-water mixture 3-4 times a day or as needed. To make a salt-water mixture, completely dissolve ½-1 tsp of salt in 1 cup of warm water.  If your doctor approves, you may use throat lozenges or sprays to soothe your throat.  Contact a doctor if:  You have large, tender lumps in your neck.  You have a rash.  You cough up green, yellow-brown, or bloody spit.  Get help right away if:  You have a stiff neck.  You drool or cannot swallow liquids.  You cannot drink or take medicines without throwing up.  You have very bad pain that does not go away with medicine.  You have problems breathing, and it is not from a stuffy nose.  You have new pain and swelling in your knees, ankles, wrists, or elbows.  Summary  Pharyngitis is a sore throat (pharynx). This is when there is redness, pain, and swelling in your throat.  If you were prescribed an antibiotic medicine, take it as told by your doctor. Do not stop taking the antibiotic even if you start to feel better.  Most of the time, pharyngitis gets better on its own. Sometimes, you may need medicine.  This information is not intended to replace advice given to you by your  health care provider. Make sure you discuss any questions you have with your health care provider.  Document Released: 06/05/2009 Document Revised: 11/30/2018 Document Reviewed: 01/23/2018  Elsevier Patient Education © 2020 [employer] Inc[employer]

## 2022-12-01 ENCOUNTER — OCCUPATIONAL MEDICINE (OUTPATIENT)
Dept: URGENT CARE | Facility: PHYSICIAN GROUP | Age: 22
End: 2022-12-01
Payer: COMMERCIAL

## 2022-12-01 ENCOUNTER — HOSPITAL ENCOUNTER (OUTPATIENT)
Dept: RADIOLOGY | Facility: MEDICAL CENTER | Age: 22
End: 2022-12-01
Attending: PHYSICIAN ASSISTANT
Payer: COMMERCIAL

## 2022-12-01 VITALS
SYSTOLIC BLOOD PRESSURE: 122 MMHG | BODY MASS INDEX: 35.79 KG/M2 | WEIGHT: 250 LBS | OXYGEN SATURATION: 100 % | RESPIRATION RATE: 16 BRPM | HEART RATE: 107 BPM | TEMPERATURE: 97.7 F | DIASTOLIC BLOOD PRESSURE: 64 MMHG | HEIGHT: 70 IN

## 2022-12-01 DIAGNOSIS — M25.571 ACUTE RIGHT ANKLE PAIN: ICD-10-CM

## 2022-12-01 DIAGNOSIS — S96.911A STRAIN OF RIGHT ANKLE, INITIAL ENCOUNTER: ICD-10-CM

## 2022-12-01 PROCEDURE — 73610 X-RAY EXAM OF ANKLE: CPT | Mod: RT

## 2022-12-01 PROCEDURE — 99213 OFFICE O/P EST LOW 20 MIN: CPT | Performed by: PHYSICIAN ASSISTANT

## 2022-12-01 NOTE — LETTER
[facility] [address] [place] [address]  Phone:  [phone] - Fax:  [phone]   [facility] Progress Report and Disability Certification  Date of Service: 2022   No Show:  No  Date / Time of Next Visit: 2022   Claim Information   Patient Name: Dilan Pérez  Claim Number:     Employer:   [employer] Date of Injury: 2022     Insurer / TPA: Employers Insurance  ID / SSN:     Occupation: Teacher  Diagnosis: Diagnoses of Acute right ankle pain and Strain of right ankle, initial encounter were pertinent to this visit.    Medical Information   Related to Industrial Injury?      Subjective Complaints:  DOI 2022: Patient states she was walking and rolled her ankle on a toy and fell causing her to injure her right ankle.  Patient states for the rest of the day at work she mostly sat and did not walk or stand.  Patient states she has fractured a growth plate of her ankle but is unsure if it was her right or left ankle.   Objective Findings: Patient is able to plantarflex and dorsiflex but does have pain when doing so and also with lateral motion of the right ankle.  Tenderness to palpation with significant swelling to the lateral malleolus and medial malleolus.  No bruising appreciated the area.  Neurovascular intact distally.  Antalgic gait favoring the right side.  Right Achilles intact.   Pre-Existing Condition(s):     Assessment:   Initial Visit    Status:    Permanent Disability:     Plan:      Diagnostics: X-ray    Comments:       Disability Information   Status: Released to Restricted Duty    From:  2022  Through: 2022 Restrictions are: Temporary   Physical Restrictions   Sitting:    Standin hrs/day Stoopin hrs/day Bending:      Squattin hrs/day Walking:  < or = to 2 hrs/day Climbin hrs/day Pushing:      Pulling:    Other:    Reaching Above Shoulder (L):   Reaching Above  Shoulder (R):       Reaching Below Shoulder (L):    Reaching Below Shoulder (R):      Not to exceed Weight Limits   Carrying(hrs):   Weight Limit(lb):   Lifting(hrs):   Weight  Limit(lb):     Comments: Recommend use of rest, ice, compression elevation as well as ankle brace to the area.  If patient goes back to work tomorrow would recommend very limited duty with no standing or walking and would recommend lots of rest over the weekend and follow-up on Monday for reevaluation.  Patient provided with a lace up ankle brace and Ace wrap today.  Recommend use of over-the-counter ibuprofen or Tylenol per 's instructions if needed for pain.    Repetitive Actions   Hands: i.e. Fine Manipulations from Grasping:     Feet: i.e. Operating Foot Controls:     Driving / Operate Machinery:     Health Care Provider’s Original or Electronic Signature  Fernando Hutchinson P.A.-C. Health Care Provider’s Original or Electronic Signature    Neo Izquierdo DO MPH     Clinic Name / Location: [address] [address] Clinic Phone Number: Dept: [phone]   Appointment Time: 4:10 Pm Visit Start Time: 4:32 PM   Check-In Time:  4:09 Pm Visit Discharge Time:  5:40 PM   Original-Treating Physician or Chiropractor    Page 2-Insurer/TPA    Page 3-Employer    Page 4-Employee

## 2022-12-01 NOTE — LETTER
December 2, 2022         Patient: Dilan Pérez   YOB: 2000   Date of Visit: 12/1/2022           To Whom it May Concern:    Dilan Pérez was seen in my clinic on 12/1/2022.  Please excuse patient's absence.  She will follow-up on 12/5/2022 for reevaluation.    If you have any questions or concerns, please don't hesitate to call.        Sincerely,           Fernando Hutchinson P.A.-C.  Electronically Signed

## 2022-12-01 NOTE — LETTER
"EMPLOYEE’S CLAIM FOR COMPENSATION/ REPORT OF INITIAL TREATMENT  FORM C-4    EMPLOYEE’S CLAIM - PROVIDE ALL INFORMATION REQUESTED   First Name  Dilan Last Name  Beto Birthdate                    2000                Sex  female Claim Number (Insurer’s Use Only)    Home Address  [address] Age  22 y.o. 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 (if applicable)  Walking    How did this injury or occupational disease occur? (Be specific an answer in detail. Use additional sheet if necessary)  Walking & rolled my ankel on a toy and fell   If you believe that you have an occupational disease, when did you first have knowledge of the disability and it relationship to your employment?  N/A Witnesses to the Accident  Vilma Arrington      Nature of Injury or Occupational Disease  Workers' Compensation  Part(s) of Body Injured or Affected  Ankle (R), N/A, N/A    I certify that the above is true and correct to the best of my knowledge and that I have provided this information in order to obtain the benefits of [place] Insurance and " [place]  I hereby authorize any physician, chiropractor, surgeon, practitioner, or other person, any hospital, including [facility] or [facility], any medical service organization, any insurance company, or other institution or organization to release to each other, any medical or other information, including benefits paid or payable, pertinent to this injury or disease, except information relative to diagnosis, treatment and/or counseling for AIDS, psychological conditions, alcohol or controlled substances, for which I must give specific authorization.  A Photostat of this authorization shall be as valid as the original.     Date   Place Employee’s Original or  *Electronic Signature   THIS REPORT MUST BE COMPLETED AND MAILED WITHIN 3 WORKING DAYS OF TREATMENT   Place  [facility]  Name of Facility  [place]   Date  12/1/2022 Diagnosis and Description of Injury or Occupational Disease  (M25.571) Acute right ankle pain  (S96.911A) Strain of right ankle, initial encounter Is there evidence the injured employee was under the influence of alcohol and/or another controlled substance at the time of accident?  ? No ? Yes (if yes, please explain)    Hour  4:32 PM   Diagnoses of Acute right ankle pain and Strain of right ankle, initial encounter were pertinent to this visit. No   Treatment  Recommend use of rest, ice, compression elevation as well as ankle brace to the area.  If patient goes back to work tomorrow would recommend very limited duty with no standing or walking and would recommend lots of rest over the weekend and follow-up on Monday for reevaluation.  Patient provided with a lace up ankle brace and Ace wrap today.  Recommend use of over-the-counter ibuprofen or Tylenol per 's instructions if needed for pain.  Have you advised the patient to remain off work five days or     more?    X-Ray  "Findings  Negative   ? Yes Indicate dates:   From   To      From information given by the employee, together with medical evidence, can        you directly connect this injury or occupational disease as job incurred?  Yes ? No If no, is the injured employee capable of:  ? full duty  No ? modified duty  Yes   Is additional medical care by a physician indicated?  Yes If Modified Duty, Specify any Limitations / Restrictions      Do you know of any previous injury or disease contributing to this condition or occupational disease?  ? Yes ? No (Explain if yes)                          No   Date  12/1/2022 Print Health Care Provider's   Kevin Hutchinson P.A.-C. I certify the employer’s copy of  this form was mailed on:   Address  [address]  [facility] [facility] Insurer’s Use Only     [facility]  [address]    Provider’s Tax ID Number  [id number] Telephone  Dept: [phone]             Health Care Provider’s Original or Electronic Signature  e-KEVIN Valencia P.A.-C. Degree (MD,DO, DC,PAElizabethC,APRN)   PA-C      * Complete and attach Release of Information (Form C-4A) when injured employee signs C-4 Form electronically  ORIGINAL - TREATING HEALTHCARE PROVIDER PAGE 2 - INSURER/TPA PAGE 3 - EMPLOYER PAGE 4 - EMPLOYEE             Form C-4 (rev.08/21)           BRIEF DESCRIPTION OF RIGHTS AND BENEFITS  (Pursuant to [place] 616[place])    Notice of Injury or Occupational Disease (Incident Report Form C-1): If an injury or occupational disease (OD) arises out of and in the course of employment, you must provide written notice to your employer as soon as practicable, but no later than 7 days after the accident or OD. Your employer shall maintain a sufficient supply of the required forms.    Claim for Compensation (Form C-4): If medical treatment is sought, the form C-4 is available at the place of initial treatment. A completed \"Claim for Compensation\" (Form C-4) must be filed within 90 days after an accident or OD. The treating " physician or chiropractor must, within 3 working days after treatment, complete and mail to the employer, the employer's insurer and third-party , the Claim for Compensation.    Medical Treatment: If you require medical treatment for your on-the-job injury or OD, you may be required to select a physician or chiropractor from a list provided by your workers’ compensation insurer, if it has contracted with an Organization for Managed Care (MCO) or Preferred Provider Organization (PPO) or providers of health care. If your employer has not entered into a contract with an MCO or PPO, you may select a physician or chiropractor from the Panel of Physicians and Chiropractors. Any medical costs related to your industrial injury or OD will be paid by your insurer.    Temporary Total Disability (TTD): If your doctor has certified that you are unable to work for a period of at least 5 consecutive days, or 5 cumulative days in a 20-day period, or places restrictions on you that your employer does not accommodate, you may be entitled to TTD compensation.    Temporary Partial Disability (TPD): If the wage you receive upon reemployment is less than the compensation for TTD to which you are entitled, the insurer may be required to pay you TPD compensation to make up the difference. TPD can only be paid for a maximum of 24 months.    Permanent Partial Disability (PPD): When your medical condition is stable and there is an indication of a PPD as a result of your injury or OD, within 30 days, your insurer must arrange for an evaluation by a rating physician or chiropractor to determine the degree of your PPD. The amount of your PPD award depends on the date of injury, the results of the PPD evaluation, your age and wage.    Permanent Total Disability (PTD): If you are medically certified by a treating physician or chiropractor as permanently and totally disabled and have been granted a PTD status by your insurer, you are  entitled to receive monthly benefits not to exceed 66 2/3% of your average monthly wage. The amount of your PTD payments is subject to reduction if you previously received a lump-sum PPD award.    Vocational Rehabilitation Services: You may be eligible for vocational rehabilitation services if you are unable to return to the job due to a permanent physical impairment or permanent restrictions as a result of your injury or occupational disease.    Transportation and Per Elizabeth Reimbursement: You may be eligible for travel expenses and per elizabeth associated with medical treatment.    Reopening: You may be able to reopen your claim if your condition worsens after claim closure.     Appeal Process: If you disagree with a written determination issued by the insurer or the insurer does not respond to your request, you may appeal to the Department of Administration, , by following the instructions contained in your determination letter. You must appeal the determination within 70 days from the date of the determination letter at [address], [address], [place], [place] [address], or [address], [place] [address]. If you disagree with the  decision, you may appeal to the Department of Administration, . You must file your appeal within 30 days from the date of the  decision letter at [address], [address], [place], [place] [address], or [address], [place] [address]. If you disagree with a decision of an , you may file a petition for judicial review with the District Court. You must do so within 30 days of the Appeal Officer’s decision. You may be represented by an  at your own expense or you may contact the [facility] for possible representation.    [place]  for Injured Workers (NAIW): If you disagree with a  decision, you may request that NAIW represent  you without charge at an  Hearing. For information regarding denial of benefits, you may contact the [facility] at: [address], [address], [place] [address], [phone], or [address] [facility], [address], [place] [address], [phone]    To File a Complaint with the Division: If you wish to file a complaint with the  of the Division of Industrial Relations (DIR),  please contact the Workers’ Compensation Section, [address], [address], [place], [place] [address], telephone [phone]) [phone], or [address], [address], [place], [place] [address], telephone [phone]) [phone].    For assistance with Workers’ Compensation Issues: You may contact the [facility] Office for Consumer Health Assistance, [address], [address], [place], [place] [address], Toll Free 1-542.924.7356, Web site: http://[facility].nv.gov/Programs/JESSICA E-mail: jessica@[facility].nv.[facility]              __________________________________________________________________                                    _________________            Employee Name / Signature                                                                                                                            Date                                                                                                                                                                                                                              D-2 (rev. 10/20)

## 2022-12-02 NOTE — PROGRESS NOTES
"Subjective     Dilan Pérez is a 22 y.o. female who presents with Ankle Injury (WC NEW /Tripped on a toy and rolled R ankle, x today)      DOI 12/1/2022: Patient states she was walking and rolled her ankle on a toy and fell causing her to injure her right ankle.  Patient states for the rest of the day at work she mostly sat and did not walk or stand.  Patient states she has fractured a growth plate of her ankle but is unsure if it was her right or left ankle.     HPI  Patient presents today for evaluation of work-related injury as described above.  Review of Systems   Musculoskeletal:         Right ankle pain     PMH: No pertinent past medical history to this problem  MEDS: Medications were reviewed in Epic  ALLERGIES: Allergies were reviewed in Epic  SOCHX: Works as a teacher at [employer]  FH: No pertinent family history to this problem         Objective     /64   Pulse (!) 107   Temp 36.5 °C (97.7 °F)   Resp 16   Ht 1.778 m (5' 10\")   Wt 113 kg (250 lb)   SpO2 100%   BMI 35.87 kg/m²      Physical Exam  Vitals and nursing note reviewed.   Constitutional:       General: She is not in acute distress.     Appearance: Normal appearance. She is well-developed. She is not ill-appearing or toxic-appearing.   HENT:      Head: Normocephalic and atraumatic.      Right Ear: Hearing normal.      Left Ear: Hearing normal.   Cardiovascular:      Rate and Rhythm: Normal rate.   Pulmonary:      Effort: Pulmonary effort is normal.   Musculoskeletal:      Comments: As described below   Skin:     General: Skin is warm and dry.   Neurological:      Mental Status: She is alert.      Coordination: Coordination normal.   Psychiatric:         Mood and Affect: Mood normal.       Patient is able to plantarflex and dorsiflex but does have pain when doing so and also with lateral motion of the right ankle.  Tenderness to palpation with significant swelling to the lateral malleolus and medial malleolus.  No bruising " appreciated the area.  Neurovascular intact distally.  Antalgic gait favoring the right side.  Right Achilles intact.      DX ANKLE  FINDINGS:  There is normal bony mineralization of the right ankle. There is no evidence of fracture, dislocation, or osseous lesion. There is soft tissue swelling about the right ankle.     IMPRESSION:     1.  Soft tissue swelling about the right ankle.     2.  There is no evidence of fracture, dislocation, or osseous lesion..         Assessment & Plan   1. Acute right ankle pain    - DX-ANKLE 3+ VIEWS RIGHT; Future    2. Strain of right ankle, initial encounter    Recommend use of rest, ice, compression elevation as well as ankle brace to the area.  If patient goes back to work tomorrow would recommend very limited duty with no standing or walking and would recommend lots of rest over the weekend and follow-up on Monday for reevaluation.  Patient provided with a lace up ankle brace and Ace wrap today.  Recommend use of over-the-counter ibuprofen or Tylenol per 's instructions if needed for pain.         Please note that this dictation was created using voice recognition software. I have made every reasonable attempt to correct obvious errors, but I expect that there may be errors of grammar and possibly content that I did not discover before finalizing the note.

## 2022-12-05 ENCOUNTER — OCCUPATIONAL MEDICINE (OUTPATIENT)
Dept: URGENT CARE | Facility: PHYSICIAN GROUP | Age: 22
End: 2022-12-05
Payer: COMMERCIAL

## 2022-12-05 VITALS
DIASTOLIC BLOOD PRESSURE: 78 MMHG | OXYGEN SATURATION: 98 % | RESPIRATION RATE: 16 BRPM | BODY MASS INDEX: 35.79 KG/M2 | WEIGHT: 250 LBS | HEIGHT: 70 IN | SYSTOLIC BLOOD PRESSURE: 114 MMHG | TEMPERATURE: 98.1 F | HEART RATE: 113 BPM
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
## 2022-12-05 DIAGNOSIS — S93.401D SPRAIN OF RIGHT ANKLE, UNSPECIFIED LIGAMENT, SUBSEQUENT ENCOUNTER: ICD-10-CM

## 2022-12-05 PROBLEM — S93.401A RIGHT ANKLE SPRAIN: Status: ACTIVE | Noted: 2022-12-05

## 2022-12-05 PROCEDURE — 99213 OFFICE O/P EST LOW 20 MIN: CPT | Performed by: NURSE PRACTITIONER

## 2022-12-05 NOTE — LETTER
[facility] [address] [address]  Phone:  [phone] - Fax:  [phone]   [facility] Progress Report and Disability Certification  Date of Service: 12/5/2022   No Show:  No  Date / Time of Next Visit: 12/12/2022   Claim Information   Patient Name: Dilan Pérez  Claim Number:     Employer:    Date of Injury: 12/1/2022     Insurer / TPA: Employers Insurance  ID / SSN:     Occupation: Teacher  Diagnosis: There were no encounter diagnoses.    Medical Information   Related to Industrial Injury? Yes    Subjective Complaints:  DOI 12/1/2022: Patient states she was walking and rolled her ankle on a toy and fell causing her to injure her right ankle. Patient reports that she did go back to work for one day, and they had her sit in a chair for limited weight bearing per her restrictions. 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  Patient reports use of ice, brace and elevation, but has not had any improvement in her symptoms. She did attend a family event and after 20 minutes of walking, experienced significant pain and swelling.  She has been limited weightbearing since, with judicious use of ice, bracing and elevation. She states the brace does help her ankle feel more stable.    Objective Findings: Physical Exam  Vitals and nursing note reviewed.   Constitutional:       General: She is not in acute distress.     Appearance: Normal appearance. She is well-developed. She is not ill-appearing or toxic-appearing.   HENT:      Head: Normocephalic and atraumatic.      Right Ear: Hearing normal.      Left Ear: Hearing normal.   Cardiovascular:      Rate and Rhythm: Normal rate.   Pulmonary:      Effort: Pulmonary effort is normal.   Musculoskeletal:      Comments: As described below   Skin:     General: Skin is warm and dry.   Neurological:      Mental Status: She is alert.      Coordination:  Coordination normal.   Psychiatric:         Mood and Affect: Mood normal.         Patient is able to plantarflex and dorsiflex but does have pain when doing so and also with lateral motion of the right ankle.  Tenderness to palpation with significant swelling to the lateral malleolus and medial malleolus.  Bruising to the ankle is noted.   Neurovascular intact distally.  Antalgic gait favoring the right side.  Right Achilles intact.   Pre-Existing Condition(s):     Assessment:   Condition Same    Status: Additional Care Required  Permanent Disability:No    Plan:      Diagnostics:      Comments:       Disability Information   Status: Temporarily Totally Disabled    From:  12/5/2022  Through: 12/12/2022 Restrictions are: Temporary   Physical Restrictions   Sitting:    Standing:    Stooping:    Bending:      Squatting:    Walking:    Climbing:    Pushing:      Pulling:    Other:    Reaching Above Shoulder (L):   Reaching Above Shoulder (R):       Reaching Below Shoulder (L):    Reaching Below Shoulder (R):      Not to exceed Weight Limits   Carrying(hrs):   Weight Limit(lb):   Lifting(hrs):   Weight  Limit(lb):     Comments: Patient to be off work completely for one week.    If little to no improvement at next visit, consider referral to foot/ankle speciality and/or occupational medicine.  Recommend use of rest, ice, compression elevation as well as ankle brace to the area.    Recommend use of over-the-counter ibuprofen or Tylenol per 's instructions if needed for pain.    Repetitive Actions   Hands: i.e. Fine Manipulations from Grasping:     Feet: i.e. Operating Foot Controls:     Driving / Operate Machinery:     Health Care Provider’s Original or Electronic Signature  MAHESH Hines Health Care Provider’s Original or Electronic Signature    Neo Izquierdo DO MPH     Clinic Name / Location: [facility] [address] [address] Clinic Phone Number: Dept:  [phone]   Appointment Time: 5:00 Pm Visit Start Time: 6:04 PM   Check-In Time:  4:43 Pm Visit Discharge Time:  6:42 PM   Original-Treating Physician or Chiropractor    Page 2-Insurer/TPA    Page 3-Employer    Page 4-Employee

## 2022-12-06 NOTE — PROGRESS NOTES
"Subjective:     Dilan Pérez is a 22 y.o. female who presents for Fall (WC 1st FV/R ankle/Still swelling and pain radiating to top of foot. 6/10 pain/Initial 12/1)      DOI 12/1/2022: Patient states she was walking and rolled her ankle on a toy and fell causing her to injure her right ankle. Patient reports that she did go back to work for one day, and they had her sit in a chair for limited weight bearing per her restrictions. 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  Patient reports use of ice, brace and elevation, but has not had any improvement in her symptoms. She did attend a family event and after 20 minutes of walking, experienced significant pain and swelling.  She has been limited weightbearing since, with judicious use of ice, bracing and elevation. She states the brace does help her ankle feel more stable.     PMH:   No pertinent past medical history to this problem  MEDS:  Medications were reviewed in EMR  ALLERGIES:  Allergies were reviewed in EMR  SOCHX:  Works as a teacher  FH:   No pertinent family history to this problem       Objective:     /78 (BP Location: Left arm, Patient Position: Sitting, BP Cuff Size: Adult)   Pulse (!) 113   Temp 36.7 °C (98.1 °F) (Temporal)   Resp 16   Ht 1.778 m (5' 10\")   Wt 113 kg (250 lb)   SpO2 98%   BMI 35.87 kg/m²     Physical Exam  Vitals and nursing note reviewed.   Constitutional:       General: She is not in acute distress.     Appearance: Normal appearance. She is well-developed. She is not ill-appearing or toxic-appearing.   HENT:      Head: Normocephalic and atraumatic.      Right Ear: Hearing normal.      Left Ear: Hearing normal.   Cardiovascular:      Rate and Rhythm: Normal rate.   Pulmonary:      Effort: Pulmonary effort is normal.   Musculoskeletal:      Comments: As described below   Skin:     General: Skin is warm and dry.   Neurological:      Mental Status: She is alert.      " Coordination: Coordination normal.   Psychiatric:         Mood and Affect: Mood normal.         Patient is able to plantarflex and dorsiflex but does have pain when doing so and also with lateral motion of the right ankle.  Tenderness to palpation with significant swelling to the lateral malleolus and medial malleolus.  Bruising to the ankle is noted.   Neurovascular intact distally.  Antalgic gait favoring the right side.  Right Achilles intact.    Assessment/Plan:       1. Sprain of right ankle, unspecified ligament, subsequent encounter    2. Work related injury  Temporarily Totally Disabled FROM 12/5/2022 TO 12/12/2022  Patient to be off work completely for one week.    If little to no improvement at next visit, consider referral to foot/ankle speciality and/or occupational medicine.  Recommend use of rest, ice, compression elevation as well as ankle brace to the area.    Recommend use of over-the-counter ibuprofen or Tylenol per 's instructions if needed for pain.       Differential diagnosis, natural history, supportive care, and indications for immediate follow-up discussed.

## 2022-12-12 ENCOUNTER — OCCUPATIONAL MEDICINE (OUTPATIENT)
Dept: URGENT CARE | Facility: PHYSICIAN GROUP | Age: 22
End: 2022-12-12
Payer: COMMERCIAL

## 2022-12-12 VITALS
TEMPERATURE: 98.5 F | RESPIRATION RATE: 16 BRPM | SYSTOLIC BLOOD PRESSURE: 110 MMHG | HEIGHT: 70 IN | HEART RATE: 110 BPM | BODY MASS INDEX: 35.79 KG/M2 | OXYGEN SATURATION: 98 % | WEIGHT: 250 LBS | DIASTOLIC BLOOD PRESSURE: 60 MMHG
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
## 2022-12-12 DIAGNOSIS — S93.491A SPRAIN OF ANTERIOR TALOFIBULAR LIGAMENT OF RIGHT ANKLE, INITIAL ENCOUNTER: ICD-10-CM

## 2022-12-12 PROCEDURE — 99213 OFFICE O/P EST LOW 20 MIN: CPT | Performed by: STUDENT IN AN ORGANIZED HEALTH CARE EDUCATION/TRAINING PROGRAM

## 2022-12-12 RX ORDER — IBUPROFEN 200 MG
200 TABLET ORAL EVERY 6 HOURS PRN
COMMUNITY
End: 2023-03-20

## 2022-12-12 NOTE — LETTER
[facility] [address] [place] [address]  Phone:  [phone] - Fax:  [phone]   [facility] Progress Report and Disability Certification  Date of Service: 12/12/2022   No Show:  No  Date / Time of Next Visit: 12/28/2022   Claim Information   Patient Name: Dilan Pérez  Claim Number:     Employer:    Date of Injury: 12/1/2022     Insurer / TPA: Employers Insurance  ID / SSN:     Occupation: Teacher  Diagnosis: Diagnoses of Sprain of anterior talofibular ligament of right ankle, initial encounter and Work related injury were pertinent to this visit.    Medical Information   Related to Industrial Injury? Yes    Subjective Complaints:  DOI 12/1/2022: Patient states she was walking and rolled her ankle on a toy and fell causing her to injure her right ankle.  Patient states for the rest of the day at work she mostly sat and did not walk or stand.  Patient states she has fractured a growth plate of her ankle but is unsure if it was her right or left ankle.     Today's date: 12/12/22.  Visit #3  Overall patient feels like she is doing much better, swelling improved as has the pain.  However still having persistent discomfort along the lateral margins of the ankle aggravated with weightbearing and plantar flexion.  Seems improved with ice, elevation, Tylenol and ibuprofen.  She was given an ankle brace which has provided limited relief of symptoms.  Reports a history of likely recurrent ankle sprains, bilaterally when she used to play sports.   Objective Findings: Gen: no acute distress, normal voice  Skin: dry, intact, moist mucosal membranes  Head: Atraumatic, normocephalic  Psych: normal affect, normal judgement, alert, awake  Musculoskeletal: Right ankle: Edema along the lateral malleolus.  TTP along the ATFL with positive anterior drawer test compared to contralateral ankle.       Pre-Existing Condition(s):     Assessment:   Condition  Improved    Status: Additional Care Required  Permanent Disability:No    Plan:      Diagnostics:      Comments:       Disability Information   Status: Released to Restricted Duty    From:  12/12/2022  Through: 12/19/2022 Restrictions are: Temporary   Physical Restrictions   Sitting:    Standing:    Stooping:    Bending:      Squatting:    Walking:    Climbing:    Pushing:      Pulling:    Other:    Reaching Above Shoulder (L):   Reaching Above Shoulder (R):       Reaching Below Shoulder (L):    Reaching Below Shoulder (R):      Not to exceed Weight Limits   Carrying(hrs):   Weight Limit(lb): < or = to 10 pounds Lifting(hrs):   Weight  Limit(lb): < or = to 10 pounds   Comments: Examination consistent with ATFL tear   - Restrictions per D 39 (10 pound weight limit)  -Ice, elevation, Tylenol, ibuprofen light activity as tolerated  -Wear ankle brace while at work  -Follow-up with occupational medicine at [address] [place] [address] in 1 week    Repetitive Actions   Hands: i.e. Fine Manipulations from Grasping:     Feet: i.e. Operating Foot Controls:     Driving / Operate Machinery:     Health Care Provider’s Original or Electronic Signature  Mikal Morales D.O. Health Care Provider’s Original or Electronic Signature    Neo Izquierdo DO MPH     Clinic Name / Location: [address] [address] Clinic Phone Number: Dept: [phone]   Appointment Time: 9:00 Am Visit Start Time: 9:12 AM   Check-In Time:  8:47 Am Visit Discharge Time:  9:40 AM   Original-Treating Physician or Chiropractor    Page 2-Insurer/TPA    Page 3-Employer    Page 4-Employee

## 2022-12-12 NOTE — PROGRESS NOTES
"Subjective:     Dilan Pérez is a 22 y.o. female who presents for Ankle Injury (WC FV (R) ankle and states she is getting better, but still really sore)      DOI 12/1/2022: Patient states she was walking and rolled her ankle on a toy and fell causing her to injure her right ankle.  Patient states for the rest of the day at work she mostly sat and did not walk or stand.  Patient states she has fractured a growth plate of her ankle but is unsure if it was her right or left ankle.     Today's date: 12/12/22.  Visit #3  Overall patient feels like she is doing much better, swelling improved as has the pain.  However still having persistent discomfort along the lateral margins of the ankle aggravated with weightbearing and plantar flexion.  Seems improved with ice, elevation, Tylenol and ibuprofen.  She was given an ankle brace which has provided limited relief of symptoms.  Reports a history of likely recurrent ankle sprains, bilaterally when she used to play sports.    PMH:   No pertinent past medical history to this problem  MEDS:  Medications were reviewed in EMR  ALLERGIES:  Allergies were reviewed in EMR  FH:   No pertinent family history to this problem       Objective:     /60 (BP Location: Left arm, Patient Position: Sitting, BP Cuff Size: Large adult)   Pulse (!) 110   Temp 36.9 °C (98.5 °F) (Temporal)   Resp 16   Ht 1.778 m (5' 10\")   Wt 113 kg (250 lb)   SpO2 98%   BMI 35.87 kg/m²     Gen: no acute distress, normal voice  Skin: dry, intact, moist mucosal membranes  Head: Atraumatic, normocephalic  Psych: normal affect, normal judgement, alert, awake  Musculoskeletal: Right ankle: Edema along the lateral malleolus.  TTP along the ATFL with positive anterior drawer test compared to contralateral ankle.        Assessment/Plan:       1. Sprain of anterior talofibular ligament of right ankle, initial encounter    2. Work related injury    Other orders  - ibuprofen (MOTRIN) 200 MG Tab; Take 200 " mg by mouth every 6 hours as needed.    Released to Restricted Duty FROM 12/12/2022 TO 12/19/2022  Examination consistent with ATFL tear   - Restrictions per D 39 (10 pound weight limit)  -Ice, elevation, Tylenol, ibuprofen light activity as tolerated  -Wear ankle brace while at work  -Follow-up with occupational medicine at [address] [place] [address] in 1 week     Addendum: No availability with occupational medicine to the first week in January so we will have the patient follow-up in urgent care in 2 weeks.      Differential diagnosis, natural history, supportive care, and indications for immediate follow-up discussed.

## 2022-12-28 ENCOUNTER — OCCUPATIONAL MEDICINE (OUTPATIENT)
Dept: URGENT CARE | Facility: PHYSICIAN GROUP | Age: 22
End: 2022-12-28
Payer: COMMERCIAL

## 2022-12-28 VITALS
SYSTOLIC BLOOD PRESSURE: 128 MMHG | DIASTOLIC BLOOD PRESSURE: 76 MMHG | BODY MASS INDEX: 35.79 KG/M2 | OXYGEN SATURATION: 100 % | TEMPERATURE: 97.9 F | HEIGHT: 70 IN | RESPIRATION RATE: 16 BRPM | HEART RATE: 96 BPM | WEIGHT: 250 LBS

## 2022-12-28 DIAGNOSIS — S93.491D SPRAIN OF ANTERIOR TALOFIBULAR LIGAMENT OF RIGHT ANKLE, SUBSEQUENT ENCOUNTER: ICD-10-CM

## 2022-12-28 PROCEDURE — 99213 OFFICE O/P EST LOW 20 MIN: CPT | Performed by: PHYSICIAN ASSISTANT

## 2022-12-28 NOTE — LETTER
[facility] [address] [place] [address]  Phone:  [phone] - Fax:  [phone]   [facility] Progress Report and Disability Certification  Date of Service: 12/28/2022   No Show:  No  Date / Time of Next Visit: 1/4/2023   Claim Information   Patient Name: Dilan Pérez  Claim Number:     Employer:   [employer]  Date of Injury: 12/1/2022     Insurer / TPA: Employers Insurance  ID / SSN:     Occupation: Teacher  Diagnosis: The encounter diagnosis was Sprain of anterior talofibular ligament of right ankle, subsequent encounter.    Medical Information   Related to Industrial Injury? Yes    Subjective Complaints:  DOI 12/1/2022: This is the patient's 4th visit regarding work-related injury.  Patient states she is still using meloxicam and Tylenol as needed.  She is still using the ankle brace mostly at work and not at home.  She states she is still having some pain with certain movements especially lateral motion of the right foot.  She still feels it is unstable.   Objective Findings: Patient has some mild tenderness and mild swelling to the lateral aspect of the ankle.  No bruising appreciated.  Patient has full strength against plantarflexion and dorsiflexion.  She does have pain with lateral motion.  Neurovascular intact distally.   Pre-Existing Condition(s):     Assessment:   Condition Improved    Status: Additional Care Required  Permanent Disability:No    Plan:      Diagnostics:      Comments:       Disability Information   Status: Released to Restricted Duty    From:  12/28/2022  Through: 1/4/2023 Restrictions are: Temporary   Physical Restrictions   Sitting:    Standing:    Stooping:    Bending:      Squatting:    Walking:    Climbing:    Pushing:      Pulling:    Other:    Reaching Above Shoulder (L):   Reaching Above Shoulder (R):       Reaching Below Shoulder (L):    Reaching Below Shoulder (R):      Not to exceed  Weight Limits   Carrying(hrs):   Weight Limit(lb): < or = to 10 pounds Lifting(hrs):   Weight  Limit(lb): < or = to 10 pounds   Comments: I would recommend patient continue with no lifting over 10 pounds per her previous restrictions but at this time she can start weightbearing as tolerated without the ankle brace but if she does feel comfortable she can use the ankle brace throughout the day.  Would recommend starting on ankle strengthening and range of motion exercises.  She will follow-up in 1 week for reevaluation and possible full duty at that time.  She can continue with rest and ice and elevation as well.    Repetitive Actions   Hands: i.e. Fine Manipulations from Grasping:     Feet: i.e. Operating Foot Controls:     Driving / Operate Machinery:     Health Care Provider’s Original or Electronic Signature  Fernando Hutchinson P.A.-C. Health Care Provider’s Original or Electronic Signature    Neo Izquierdo DO MPH     Clinic Name / Location: [address] [address] Clinic Phone Number: Dept: [phone]   Appointment Time: 4:15 Pm Visit Start Time: 4:51 PM   Check-In Time:  3:57 Pm Visit Discharge Time:  5:25 PM   Original-Treating Physician or Chiropractor    Page 2-Insurer/TPA    Page 3-Employer    Page 4-Employee

## 2022-12-29 NOTE — PROGRESS NOTES
"Subjective     Dilan Pérez is a 22 y.o. female who presents with Ankle Injury (Rolled R ankle/WC 3rd FV/Still sore and does not feel stable without brace)    DOI 12/1/2022: This is the patient's 4th visit regarding work-related injury.  Patient states she is still using meloxicam and Tylenol as needed.  She is still using the ankle brace mostly at work and not at home.  She states she is still having some pain with certain movements especially lateral motion of the right foot.  She still feels it is unstable.     Patient presents today for follow-up of work-related injury as described above.    Review of Systems   Musculoskeletal:         Right ankle pain     PMH: No pertinent past medical history to this problem  MEDS: Medications were reviewed in Epic  ALLERGIES: Allergies were reviewed in Epic  SOCHX: Works as a teacher   FH: No pertinent family history to this problem         Objective     /76 (BP Location: Right arm, Patient Position: Sitting, BP Cuff Size: Large adult)   Pulse 96   Temp 36.6 °C (97.9 °F) (Temporal)   Resp 16   Ht 1.778 m (5' 10\")   Wt 113 kg (250 lb)   SpO2 100%   BMI 35.87 kg/m²      Physical Exam  Vitals and nursing note reviewed.   Constitutional:       General: She is not in acute distress.     Appearance: Normal appearance. She is well-developed. She is not ill-appearing or toxic-appearing.   HENT:      Head: Normocephalic and atraumatic.      Right Ear: Hearing normal.      Left Ear: Hearing normal.   Cardiovascular:      Rate and Rhythm: Normal rate.   Pulmonary:      Effort: Pulmonary effort is normal.   Musculoskeletal:      Comments: As described below.   Skin:     General: Skin is warm and dry.   Neurological:      Mental Status: She is alert.      Coordination: Coordination normal.   Psychiatric:         Mood and Affect: Mood normal.       Patient has some mild tenderness and mild swelling to the lateral aspect of the ankle.  No bruising appreciated.  Patient " has full strength against plantarflexion and dorsiflexion.  She does have pain with lateral motion.  Neurovascular intact distally.              Assessment & Plan   1. Sprain of anterior talofibular ligament of right ankle, subsequent encounter       I would recommend patient continue with no lifting over 10 pounds per her previous restrictions but at this time she can start weightbearing as tolerated without the ankle brace but if she does feel comfortable she can use the ankle brace throughout the day.  Would recommend starting on ankle strengthening and range of motion exercises.  She will follow-up in 1 week for reevaluation and possible full duty at that time.  She can continue with rest and ice and elevation as well.  Demonstrated exercises for patient to use for her ankle.    Please note that this dictation was created using voice recognition software. I have made every reasonable attempt to correct obvious errors, but I expect that there may be errors of grammar and possibly content that I did not discover before finalizing the note.

## 2023-01-04 ENCOUNTER — OCCUPATIONAL MEDICINE (OUTPATIENT)
Dept: URGENT CARE | Facility: PHYSICIAN GROUP | Age: 23
End: 2023-01-04
Payer: COMMERCIAL

## 2023-01-04 VITALS
SYSTOLIC BLOOD PRESSURE: 146 MMHG | TEMPERATURE: 97.8 F | WEIGHT: 262 LBS | BODY MASS INDEX: 37.51 KG/M2 | HEIGHT: 70 IN | RESPIRATION RATE: 16 BRPM | OXYGEN SATURATION: 98 % | DIASTOLIC BLOOD PRESSURE: 80 MMHG

## 2023-01-04 DIAGNOSIS — S93.491D SPRAIN OF ANTERIOR TALOFIBULAR LIGAMENT OF RIGHT ANKLE, SUBSEQUENT ENCOUNTER: ICD-10-CM

## 2023-01-04 PROCEDURE — 99212 OFFICE O/P EST SF 10 MIN: CPT | Performed by: PHYSICIAN ASSISTANT

## 2023-01-04 NOTE — PROGRESS NOTES
"Subjective:     Dilan Pérez is a 22 y.o. female who presents for Follow-Up (Right ankle injury , per patient has improved )      DOI: 12/01/2022   5th visit   Patient is here for follow up after right ankle sprain. Patient reports her pain has gone away. Currently has no pain. She is walking normally. She has been performing ankle exercises and ROM exercises. She is still on work restrictions. She believes she is ready to return to work full duty.      PMH:   No pertinent past medical history to this problem  MEDS:  Medications were reviewed in EMR  ALLERGIES:  Allergies were reviewed in EMR  SOCHX:  Works as a Teacher   FH:   No pertinent family history to this problem     Objective:     BP (!) 146/80 (BP Location: Right arm, Patient Position: Sitting, BP Cuff Size: Adult)   Temp 36.6 °C (97.8 °F) (Temporal)   Resp 16   Ht 1.778 m (5' 10\")   Wt 119 kg (262 lb)   SpO2 98%   BMI 37.59 kg/m²     Constitutional: Well-appearing in no acute distress  Musculoskeletal: Right ankle- full ROM.  No TTP.  Negative erythema, edema, crepitus or deformity.  Pulses 2+.  Sensation intact.  Normal gait    Assessment/Plan:       1. Sprain of anterior talofibular ligament of right ankle, subsequent encounter    Released to Full Duty FROM   TO       Plan:   -released to full duty   -discharge MMI     Differential diagnosis, natural history, supportive care, and indications for immediate follow-up discussed.    "

## 2023-01-04 NOTE — LETTER
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    Medical Information   Related to Industrial Injury? Yes    Subjective Complaints:  DOI: 12/01/2022   5th visit   Patient is here for follow up after right ankle sprain. Patient reports her pain has gone away. Currently has no pain. She is walking normally. She has been performing ankle exercises and ROM exercises. She is still on work restrictions. She believes she is ready to return to work full duty.     Objective Findings: Constitutional: Well-appearing in no acute distress  Musculoskeletal: Right ankle- full ROM.  No TTP.  Negative erythema, edema, crepitus or deformity.  Pulses 2+.  Sensation intact.  Normal gait   Pre-Existing Condition(s):     Assessment:   Condition Improved    Status: Discharged /  MMI  Permanent Disability:No    Plan:      Diagnostics:      Comments:  Plan:   -released to full duty   -discharge MMI     Disability Information   Status: Released to Full Duty    From:     Through:   Restrictions are:     Physical Restrictions   Sitting:    Standing:    Stooping:    Bending:      Squatting:    Walking:    Climbing:    Pushing:      Pulling:    Other:    Reaching Above Shoulder (L):   Reaching Above Shoulder (R):       Reaching Below Shoulder (L):    Reaching Below Shoulder (R):      Not to exceed Weight Limits   Carrying(hrs):   Weight Limit(lb):   Lifting(hrs):   Weight  Limit(lb):     Comments:      Repetitive  Actions   Hands: i.e. Fine Manipulations from Grasping:     Feet: i.e. Operating Foot Controls:     Driving / Operate Machinery:     Health Care Provider’s Original or Electronic Signature  Vladislav Rice P.A.-C. Health Care Provider’s Original or Electronic Signature    Neo Izquierdo DO MPH     Clinic Name / Location: [address] [address] Clinic Phone Number: Dept: [phone]   Appointment Time: 12:00 Pm Visit Start Time: 12:16 PM   Check-In Time:  12:01 Pm Visit Discharge Time:     Original-Treating Physician or Chiropractor    Page 2-Insurer/TPA    Page 3-Employer    Page 4-Employee

## 2023-01-04 NOTE — LETTER
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    Medical Information   Related to Industrial Injury? Yes    Subjective Complaints:  DOI: 12/01/2022   5th visit   Patient is here for follow up after right ankle sprain. Patient reports her pain has gone away. Currently has no pain. She is walking normally. She has been performing ankle exercises and ROM exercises. She is still on work restrictions. She believes she is ready to return to work full duty.     Objective Findings: Constitutional: Well-appearing in no acute distress  Musculoskeletal: Right ankle- full ROM.  No TTP.  Negative erythema, edema, crepitus or deformity.  Pulses 2+.  Sensation intact.  Normal gait   Pre-Existing Condition(s):     Assessment:   Condition Improved    Status: Discharged /  MMI  Permanent Disability:No    Plan:      Diagnostics:      Comments:  Plan:   -released to full duty   -discharge MMI     Disability Information   Status: Released to Full Duty    From:     Through:   Restrictions are:     Physical Restrictions   Sitting:    Standing:    Stooping:    Bending:      Squatting:    Walking:    Climbing:    Pushing:      Pulling:    Other:    Reaching Above Shoulder (L):   Reaching Above Shoulder (R):       Reaching Below Shoulder (L):    Reaching Below Shoulder (R):      Not to exceed Weight Limits   Carrying(hrs):   Weight Limit(lb):   Lifting(hrs):   Weight  Limit(lb):     Comments:       Repetitive Actions   Hands: i.e. Fine Manipulations from Grasping:     Feet: i.e. Operating Foot Controls:     Driving / Operate Machinery:     Health Care Provider’s Original or Electronic Signature  Vladislav Rice P.A.-C. Health Care Provider’s Original or Electronic Signature    Neo Izquierdo DO MPH     Clinic Name / Location: [address] [address] Clinic Phone Number: Dept: [phone]   Appointment Time: 12:00 Pm Visit Start Time: 12:16 PM   Check-In Time:  12:01 Pm Visit Discharge Time:  12:42 PM   Original-Treating Physician or Chiropractor    Page 2-Insurer/TPA    Page 3-Employer    Page 4-Employee

## 2023-02-15 ENCOUNTER — OFFICE VISIT (OUTPATIENT)
Dept: URGENT CARE | Facility: PHYSICIAN GROUP | Age: 23
End: 2023-02-15
Payer: COMMERCIAL

## 2023-02-15 ENCOUNTER — HOSPITAL ENCOUNTER (OUTPATIENT)
Facility: MEDICAL CENTER | Age: 23
End: 2023-02-15
Attending: PHYSICIAN ASSISTANT
Payer: COMMERCIAL

## 2023-02-15 VITALS
BODY MASS INDEX: 36.08 KG/M2 | HEART RATE: 94 BPM | HEIGHT: 70 IN | WEIGHT: 252 LBS | SYSTOLIC BLOOD PRESSURE: 110 MMHG | TEMPERATURE: 98 F | RESPIRATION RATE: 16 BRPM | DIASTOLIC BLOOD PRESSURE: 62 MMHG | OXYGEN SATURATION: 99 %

## 2023-02-15 DIAGNOSIS — J02.9 PHARYNGITIS, UNSPECIFIED ETIOLOGY: ICD-10-CM

## 2023-02-15 DIAGNOSIS — J02.9 SORE THROAT: ICD-10-CM

## 2023-02-15 LAB
INT CON NEG: NEGATIVE
INT CON POS: POSITIVE
S PYO AG THROAT QL: NEGATIVE

## 2023-02-15 PROCEDURE — 99213 OFFICE O/P EST LOW 20 MIN: CPT | Performed by: PHYSICIAN ASSISTANT

## 2023-02-15 PROCEDURE — 87070 CULTURE OTHR SPECIMN AEROBIC: CPT

## 2023-02-15 PROCEDURE — 87880 STREP A ASSAY W/OPTIC: CPT | Performed by: PHYSICIAN ASSISTANT

## 2023-02-15 PROCEDURE — 87077 CULTURE AEROBIC IDENTIFY: CPT

## 2023-02-15 RX ORDER — AMOXICILLIN 500 MG/1
500 CAPSULE ORAL 2 TIMES DAILY
Qty: 20 CAPSULE | Refills: 0 | Status: SHIPPED | OUTPATIENT
Start: 2023-02-15 | End: 2023-02-25

## 2023-02-15 NOTE — LETTER
February 15, 2023    To Whom It May Concern:         This is confirmation that Dilan Tonye Beto attended her scheduled appointment with Jing Larsen P.A.-C. on 2/15/23. Please excuse patient from work tomorrow.         If you have any questions please do not hesitate to call me at the phone number listed below.    Sincerely,          Jing Larsen P.A.-C.  [phone]

## 2023-02-16 DIAGNOSIS — J02.9 PHARYNGITIS, UNSPECIFIED ETIOLOGY: ICD-10-CM

## 2023-02-16 DIAGNOSIS — J02.9 SORE THROAT: ICD-10-CM

## 2023-02-16 NOTE — PROGRESS NOTES
"Subjective:   Dilan Pérez is a 22 y.o. female who presents for Sore Throat (X 5 days with fatigue, nausea, light congestion and weak)     Fiance had covid.  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  Patient presents with chief complaint of severe ST, painful to talk, neck pain, fatigue, dizziness, body aches.  No fevers, mild chills.  Reports she works in a  where there are multiple cases of strep hand-foot-and-mouth disease etc.  Symptoms began Friday night.  She does also report her fiancé had COVID a couple of weeks ago.  She has minimal cough.  No shortness of breath.  No underlying respiratory illnesses.  Appetite is decreased.        Medications:  ibuprofen Tabs  meloxicam    Allergies:             Patient has no known allergies.    Surgical History:       No past surgical history on file.    Past Social Hx:  Dilan Pérez  reports that she has never smoked. She has never used smokeless tobacco. She reports that she does not drink alcohol and does not use drugs.     Past Family Hx:   Dilan Pérez family history is not on file.       Problem list, medications, and allergies reviewed by myself today in Epic.     Objective:     /62 (BP Location: Left arm, Patient Position: Sitting, BP Cuff Size: Large adult)   Pulse 94   Temp 36.7 °C (98 °F) (Temporal)   Resp 16   Ht 1.778 m (5' 10\")   Wt 114 kg (252 lb)   SpO2 99%   BMI 36.16 kg/m²     Physical Exam  Vitals and nursing note reviewed.   Constitutional:       General: She is not in acute distress.     Appearance: Normal appearance. She is well-developed. She is not ill-appearing or diaphoretic.   HENT:      Head: Normocephalic.      Right Ear: Tympanic membrane normal.      Left Ear: Tympanic membrane normal.      Nose: Congestion and rhinorrhea present.      Mouth/Throat:      Palate: No mass.      Pharynx: Posterior oropharyngeal erythema present. No pharyngeal swelling, oropharyngeal exudate or uvula swelling.      " Tonsils: No tonsillar exudate or tonsillar abscesses. 1+ on the right. 1+ on the left.      Comments: Surgical tonsils.  Significant erythema noted to pharynx.  Mild exudate noted on right.  Eyes:      Conjunctiva/sclera: Conjunctivae normal.      Pupils: Pupils are equal, round, and reactive to light.   Cardiovascular:      Rate and Rhythm: Normal rate and regular rhythm.      Pulses: Normal pulses.      Heart sounds: No murmur heard.  Pulmonary:      Effort: Pulmonary effort is normal. No tachypnea, accessory muscle usage or respiratory distress.      Breath sounds: Normal breath sounds. No stridor. No decreased breath sounds, wheezing, rhonchi or rales.   Musculoskeletal:         General: Normal range of motion.      Cervical back: Normal range of motion and neck supple.   Skin:     General: Skin is warm and dry.   Neurological:      Mental Status: She is alert and oriented to person, place, and time.   Psychiatric:         Behavior: Behavior is cooperative.     Rapid strep negative  Assessment/Plan:     Diagnosis and Associated Orders:     1. Sore throat  - POCT Rapid Strep A  - CULTURE THROAT; Future    2. Pharyngitis, unspecified etiology  - CULTURE THROAT; Future  - amoxicillin (AMOXIL) 500 MG Cap; Take 1 Capsule by mouth 2 times a day for 10 days.  Dispense: 20 Capsule; Refill: 0        Comments/MDM:  Rapid strep negative.  We will collect throat culture and sensitivity.  High suspicion for bacterial pharyngitis given exudate, cervical lymphadenopathy, lack of significant cough.  Will treat empirically and be in touch with patient via MyChart regarding results of throat culture.  Warm salt water gargles.  Alternate tylenol and ibuprofen for pain.  Soft foods and cool liquids.  Throat lozenges as directed      I considered other causes of pharyngitis including Group C, G strep, peritonsillar abscess, Mononucleosis, karuna's angina, and retropharyngeal abscess but the patient's reported symptoms and my exam do  not support these alternative diagnosis based on information I have available today.  This may change and I encouraged the patient to return to clinic if they are experiencing new symptoms or their symptoms fail to resolve with time as we cannot rule out all pathology from a single Urgent Care visit.     The short duration of patient's symptoms and lack of constitutional symptoms made testing for Mono futile.  Since it takes 6-10 days for your body to begin to produce antibodies (which monospot tests for), there is a very high likelihood of a false negative.  I encouraged the patient to return to clinic in 1-2 weeks if they feel other constitutional symptoms that would suggest EBV/Mo      I personally reviewed prior external notes and test results pertinent to today's visit.  Red flags discussed as well as indications to present to the Emergency Department.  Supportive care, natural history, differential diagnoses, and indications for immediate follow-up discussed.  Patient expresses understanding and agrees to plan.  Patient denies any other questions or concerns.    Follow-up with the primary care physician for recheck, reevaluation, and consideration of further management.      Please note that this dictation was created using voice recognition software. I have made a reasonable attempt to correct obvious errors, but I expect that there are errors of grammar and possibly content that I did not discover before finalizing the note.    This note was electronically signed by Jing Larsen PA-C           unsure

## 2023-02-18 LAB
BACTERIA SPEC RESP CULT: ABNORMAL
BACTERIA SPEC RESP CULT: ABNORMAL
SIGNIFICANT IND 70042: ABNORMAL
SITE SITE: ABNORMAL
SOURCE SOURCE: ABNORMAL

## 2023-02-18 NOTE — RESULT ENCOUNTER NOTE
Hello,     Your throat culture results are positive for bacterial infection. The antibiotic that you were issued should cover this infection. I recommend continued use of OTC tylenol/ibuprofen and cold/cough/sinus medications as needed. If your symptoms are persistent or worsening please follow up with PCP or return to urgent care for re-evaluation. Please let me know if you have questions or concerns.     Take Care,   Jing Larsen PA-C

## 2023-03-20 ENCOUNTER — OFFICE VISIT (OUTPATIENT)
Dept: URGENT CARE | Facility: PHYSICIAN GROUP | Age: 23
End: 2023-03-20
Payer: COMMERCIAL

## 2023-03-20 VITALS
OXYGEN SATURATION: 100 % | RESPIRATION RATE: 18 BRPM | HEART RATE: 117 BPM | BODY MASS INDEX: 34.65 KG/M2 | WEIGHT: 242 LBS | SYSTOLIC BLOOD PRESSURE: 116 MMHG | DIASTOLIC BLOOD PRESSURE: 60 MMHG | TEMPERATURE: 99.1 F | HEIGHT: 70 IN

## 2023-03-20 DIAGNOSIS — J01.40 ACUTE PANSINUSITIS, RECURRENCE NOT SPECIFIED: ICD-10-CM

## 2023-03-20 PROCEDURE — 99214 OFFICE O/P EST MOD 30 MIN: CPT | Performed by: NURSE PRACTITIONER

## 2023-03-20 RX ORDER — FLUTICASONE PROPIONATE 50 MCG
2 SPRAY, SUSPENSION (ML) NASAL DAILY
Qty: 16 G | Refills: 0 | Status: SHIPPED | OUTPATIENT
Start: 2023-03-20 | End: 2023-04-03

## 2023-03-20 RX ORDER — AMOXICILLIN AND CLAVULANATE POTASSIUM 875; 125 MG/1; MG/1
1 TABLET, FILM COATED ORAL 2 TIMES DAILY
Qty: 20 TABLET | Refills: 0 | Status: SHIPPED | OUTPATIENT
Start: 2023-03-20 | End: 2023-03-30

## 2023-03-20 NOTE — LETTER
March 20, 2023       Patient: Dilan Pérez   YOB: 2000   Date of Visit: 3/20/2023         To Whom It May Concern:    In my medical opinion, I recommend that Dilan Pérez return to full duty, no restrictions on 03/21/23              Sincerely,          VALERIA OwensPVIVIENNE.  Electronically Signed

## 2023-03-20 NOTE — PROGRESS NOTES
"Dilan Pérez is a 22 y.o. female who presents for Headache (X 2 wk headache 3 days ago nausea, dizziness)      HPI  This is a new problem. Dilan Pérez is a 22 y.o. patient who presents to urgent care with c/o: headache for 2 weeks. FEeling sensitive to light. Wears contacts. Online student on computer a lot. Right side of her face feels burning.   Usually gets headach prior to onset of menses. This time her headache stayed.   Thick nasal drainage, cough and congestion. Mild ear pain. If she changes position quickly she feels light headed. Clears throat a lot.   Treatments tried: excedrin  Denies fever, body aches,   No other aggravating or alleviating factors.       ROS See HPI    Allergies:     No Known Allergies    PMSFS Hx:  Past Medical History:   Diagnosis Date    Anxiety     on meds    Depression     on meds     History reviewed. No pertinent surgical history.  History reviewed. No pertinent family history.  Social History     Tobacco Use    Smoking status: Never    Smokeless tobacco: Never   Substance Use Topics    Alcohol use: Yes     Comment: occ       Problems:   Patient Active Problem List   Diagnosis    Allergic rhinitis    IUD migration    IUD strings lost    Right hip pain    Sacroiliac strain    Strain of lumbar region    Right ankle sprain    Work related injury       Medications:   No current outpatient medications on file prior to visit.     No current facility-administered medications on file prior to visit.          Objective:     /60 (BP Location: Left arm, Patient Position: Sitting, BP Cuff Size: Adult)   Pulse (!) 117   Temp 37.3 °C (99.1 °F) (Temporal)   Resp 18   Ht 1.778 m (5' 10\")   Wt 110 kg (242 lb)   SpO2 100%   BMI 34.72 kg/m²     Physical Exam  Vitals and nursing note reviewed.   Constitutional:       General: She is not in acute distress.     Appearance: Normal appearance. She is well-developed. She is not ill-appearing.   HENT:      Head: Normocephalic. "      Right Ear: Hearing, tympanic membrane, ear canal and external ear normal.      Left Ear: Hearing, tympanic membrane, ear canal and external ear normal.      Nose: Mucosal edema and rhinorrhea present. Rhinorrhea is purulent.      Right Sinus: Maxillary sinus tenderness present. No frontal sinus tenderness.      Left Sinus: Maxillary sinus tenderness present. No frontal sinus tenderness.      Mouth/Throat:      Pharynx: Uvula midline. Oropharyngeal exudate and posterior oropharyngeal erythema present.   Eyes:      General:         Right eye: No discharge.         Left eye: No discharge.      Conjunctiva/sclera:      Right eye: Right conjunctiva is injected.      Left eye: Left conjunctiva is injected.   Neck:      Trachea: Trachea and phonation normal.   Cardiovascular:      Rate and Rhythm: Normal rate and regular rhythm.      Chest Wall: PMI is not displaced.      Pulses: Normal pulses.      Heart sounds: Normal heart sounds.   Pulmonary:      Effort: Pulmonary effort is normal.      Breath sounds: Normal breath sounds.   Musculoskeletal:      Cervical back: Full passive range of motion without pain, normal range of motion and neck supple.   Lymphadenopathy:      Head:      Right side of head: No tonsillar adenopathy.      Left side of head: No tonsillar adenopathy.      Cervical: No cervical adenopathy.      Upper Body:      Right upper body: No supraclavicular adenopathy.      Left upper body: No supraclavicular adenopathy.   Skin:     General: Skin is warm and dry.      Capillary Refill: Capillary refill takes less than 2 seconds.   Neurological:      General: No focal deficit present.      Mental Status: She is alert and oriented to person, place, and time.      Gait: Gait normal.   Psychiatric:         Mood and Affect: Mood normal.         Speech: Speech normal.         Behavior: Behavior normal. Behavior is cooperative.         Thought Content: Thought content normal.         Assessment /Associated  Orders:      1. Acute pansinusitis, recurrence not specified  fluticasone (FLONASE) 50 MCG/ACT nasal spray    amoxicillin-clavulanate (AUGMENTIN) 875-125 MG Tab            Medical Decision Making:    Pt is clinically stable at today's acute urgent care visit.  No acute distress noted. Appropriate for outpatient care at this time.   Acute problem today with uncertain prognosis.   OTC antihistamine of choice. Follow manufactures dosing and safety guidelines.   Educated in proper administration of  prescription medication(s) ordered today including safety, possible SE, risks, benefits, rationale and alternatives to therapy.    Humidifier at night prn     Discussed Dx, management options (risks,benefits, and alternatives to planned treatment), natural progression and supportive care.  Expressed understanding and the treatment plan was agreed upon.   Questions were encouraged and answered   Return to urgent care prn if new or worsening sx or if there is no improvement in condition prn.    Educated in Red flags and indications to immediately call 911 or present to the Emergency Department.       Time I spent evaluating Dilan Pérez in urgent care today was 30  minutes. This time includes preparing for visit, reviewing any pertinent notes or test results, counseling/education, exam, obtaining HPI, interpretation of lab tests, medication management and documentation as indicated above.Time does not include separately billable procedures noted .       Please note that this dictation was created using voice recognition software. I have worked with consultants from the vendor as well as technical experts from [facility] to optimize the interface. I have made every reasonable attempt to correct obvious errors, but I expect that there are errors of grammar and possibly content that I did not discover before finalizing the note.  This note was electronically signed by provider

## 2023-04-10 ENCOUNTER — OFFICE VISIT (OUTPATIENT)
Dept: URGENT CARE | Facility: PHYSICIAN GROUP | Age: 23
End: 2023-04-10
Payer: COMMERCIAL

## 2023-04-10 VITALS
WEIGHT: 245 LBS | OXYGEN SATURATION: 100 % | HEIGHT: 70 IN | BODY MASS INDEX: 35.07 KG/M2 | DIASTOLIC BLOOD PRESSURE: 70 MMHG | RESPIRATION RATE: 18 BRPM | TEMPERATURE: 98 F | SYSTOLIC BLOOD PRESSURE: 126 MMHG | HEART RATE: 87 BPM

## 2023-04-10 DIAGNOSIS — M79.604 LEG PAIN, LATERAL, RIGHT: ICD-10-CM

## 2023-04-10 PROCEDURE — 99213 OFFICE O/P EST LOW 20 MIN: CPT

## 2023-04-10 ASSESSMENT — ENCOUNTER SYMPTOMS
FALLS: 0
SENSORY CHANGE: 0
FEVER: 0
SHORTNESS OF BREATH: 0
CHILLS: 0

## 2023-04-10 NOTE — LETTER
April 10, 2023    To Whom It May Concern:         This is confirmation that Dilan Zoe Pérez attended her scheduled appointment with Snuita Storm P.A.-C. on 4/10/23. She may return to work in 1-3 days if feeling well.          If you have any questions please do not hesitate to call me at the phone number listed below.    Sincerely,          Sunita Storm P.A.-C.  [phone]

## 2023-04-10 NOTE — PROGRESS NOTES
"Subjective:     CHIEF COMPLAINT  Chief Complaint   Patient presents with    Leg Pain     X 1 day RT side upper thigh pain.        HPI  Dilan Pérez is a very pleasant 22 y.o. female who presents with lateral R thigh pain for one day. She reports the pain came on gradually yesterday and was not preceded by an injury. She denies any recent falls or trauma to that leg. She reports the pain increases with walking, bending, and squatting. The pain improves with rest. She denies any skin changes to the area and reports the pain feels like an ache and like her hip needs to \"pop\". She has taken Ibuprofen and Tylenol with some relief. She also reports she is currently menstruating and that she gets cramps that radiate into her thighs.     REVIEW OF SYSTEMS  Review of Systems   Constitutional:  Negative for chills, fever and malaise/fatigue.   Respiratory:  Negative for shortness of breath.    Cardiovascular:  Negative for chest pain.   Musculoskeletal:  Negative for falls.   Neurological:  Negative for sensory change.     PAST MEDICAL HISTORY  Patient Active Problem List    Diagnosis Date Noted    Right ankle sprain 12/05/2022    Work related injury 12/05/2022    Allergic rhinitis 07/08/2021    IUD migration 03/10/2021    IUD strings lost 03/10/2021    Right hip pain 04/24/2019    Sacroiliac strain 02/06/2019    Strain of lumbar region 02/06/2019       SURGICAL HISTORY  patient denies any surgical history    ALLERGIES  No Known Allergies    CURRENT MEDICATIONS  Home Medications       Reviewed by Sunita Storm P.A.-C. (Physician Assistant) on 04/10/23 at 1231  Med List Status: <None>     Medication Last Dose Status        Patient Brandan Taking any Medications                           SOCIAL HISTORY  Social History     Tobacco Use    Smoking status: Never    Smokeless tobacco: Never   Vaping Use    Vaping Use: Never used   Substance and Sexual Activity    Alcohol use: Yes     Comment: occ    Drug use: Never    " "Sexual activity: Not on file       FAMILY HISTORY  History reviewed. No pertinent family history.       Objective:     VITAL SIGNS: /70 (BP Location: Left arm, Patient Position: Sitting, BP Cuff Size: Adult)   Pulse 87   Temp 36.7 °C (98 °F) (Temporal)   Resp 18   Ht 1.778 m (5' 10\")   Wt 111 kg (245 lb)   SpO2 100%   BMI 35.15 kg/m²     PHYSICAL EXAM  Physical Exam  Constitutional:       General: She is not in acute distress.     Appearance: Normal appearance. She is not ill-appearing.   HENT:      Head: Normocephalic and atraumatic.      Mouth/Throat:      Mouth: Mucous membranes are moist.   Eyes:      Conjunctiva/sclera: Conjunctivae normal.   Cardiovascular:      Rate and Rhythm: Normal rate and regular rhythm.      Heart sounds: Normal heart sounds.   Pulmonary:      Effort: Pulmonary effort is normal. No respiratory distress.      Breath sounds: Normal breath sounds. No wheezing, rhonchi or rales.   Musculoskeletal:        Legs:       Comments: Pain on lateral surface of R leg. Non-tender to palpation. No erythema, ecchymosis. Limited active range of motion secondary to pain. No crepitus or bony step offs. Normal gait.    Skin:     General: Skin is warm and dry.   Neurological:      General: No focal deficit present.      Mental Status: She is alert and oriented to person, place, and time.   Psychiatric:         Mood and Affect: Mood normal.       Assessment/Plan:     1. Leg pain, lateral, right  - diclofenac sodium (VOLTAREN) 1 % Gel; Apply 2 g topically 4 times a day.  Dispense: 50 g; Refill: 0  -Rest, ice  -Tylenol as needed for pain      MDM/Comments:  Patient has stable vital signs and is non-toxic appearing. Discussed supportive care with ice/heat, rest, Tylenol/Ibuprofen as needed. Discussed potentially seeing chiropractor. X-ray not indicated at this time. No injury preceding pain. Diclofenac gel prescribed. Patient will return to clinic or to PCP for further evaluation if symptoms do not " improve.     Differential diagnosis, natural history, supportive care, and indications for immediate follow-up discussed. All questions answered. Patient agrees with the plan of care.    Follow-up as needed if symptoms worsen or fail to improve to PCP, Urgent care or Emergency Room.    I have personally reviewed prior external notes and test results pertinent to today's visit.  I have independently reviewed and interpreted all diagnostics ordered during this urgent care acute visit.   Discussed management options (risks,benefits, and alternatives to treatment). Pt expresses understanding and the treatment plan was agreed upon. Questions were encouraged and answered to pt's satisfaction.    Please note that this dictation was created using voice recognition software. I have made a reasonable attempt to correct obvious errors, but I expect that there are errors of grammar and possibly content that I did not discover before finalizing the note.

## 2023-07-18 ENCOUNTER — OFFICE VISIT (OUTPATIENT)
Dept: URGENT CARE | Facility: PHYSICIAN GROUP | Age: 23
End: 2023-07-18
Payer: COMMERCIAL

## 2023-07-18 VITALS
OXYGEN SATURATION: 97 % | HEART RATE: 119 BPM | SYSTOLIC BLOOD PRESSURE: 120 MMHG | HEIGHT: 70 IN | BODY MASS INDEX: 34.36 KG/M2 | WEIGHT: 240 LBS | DIASTOLIC BLOOD PRESSURE: 80 MMHG | RESPIRATION RATE: 18 BRPM | TEMPERATURE: 97.7 F

## 2023-07-18 DIAGNOSIS — H10.021 OTHER MUCOPURULENT CONJUNCTIVITIS OF RIGHT EYE: ICD-10-CM

## 2023-07-18 PROCEDURE — 99213 OFFICE O/P EST LOW 20 MIN: CPT

## 2023-07-18 PROCEDURE — 3079F DIAST BP 80-89 MM HG: CPT

## 2023-07-18 PROCEDURE — 3074F SYST BP LT 130 MM HG: CPT

## 2023-07-18 RX ORDER — POLYMYXIN B SULFATE AND TRIMETHOPRIM 1; 10000 MG/ML; [USP'U]/ML
1 SOLUTION OPHTHALMIC EVERY 4 HOURS
Qty: 10 ML | Refills: 0 | Status: SHIPPED | OUTPATIENT
Start: 2023-07-18 | End: 2023-07-18

## 2023-07-18 RX ORDER — OFLOXACIN 3 MG/ML
1 SOLUTION/ DROPS OPHTHALMIC 4 TIMES DAILY
Qty: 5 ML | Refills: 0 | Status: SHIPPED | OUTPATIENT
Start: 2023-07-18 | End: 2023-07-18

## 2023-07-18 RX ORDER — POLYMYXIN B SULFATE AND TRIMETHOPRIM 1; 10000 MG/ML; [USP'U]/ML
1 SOLUTION OPHTHALMIC EVERY 4 HOURS
Qty: 10 ML | Refills: 0 | Status: SHIPPED | OUTPATIENT
Start: 2023-07-18 | End: 2023-07-20

## 2023-07-18 ASSESSMENT — ENCOUNTER SYMPTOMS
PHOTOPHOBIA: 0
MYALGIAS: 0
SORE THROAT: 0
BLURRED VISION: 0
EYE DISCHARGE: 1
EYE PAIN: 0
EYE REDNESS: 1
FEVER: 0
DOUBLE VISION: 0
COUGH: 0

## 2023-07-18 NOTE — PROGRESS NOTES
Subjective:     CHIEF COMPLAINT  Chief Complaint   Patient presents with    Conjunctivitis     X 2 days Poss pink eye on R eye, swollen and red       HPI  Dilan Pérez is a very pleasant 22 y.o. female who presents with a pink right eye for 2 days.  She awoke this morning with her eyelashes crusted shut with green mucoid discharge.  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  She normally wears contacts, but has recently been wearing her glasses since the onset of her symptoms.  She has noticed a mild tenderness to her upper eyelid and slight itchiness.  She otherwise feels well and has not had any fevers, body aches, or chills.    REVIEW OF SYSTEMS  Review of Systems   Constitutional:  Negative for fever and malaise/fatigue.   HENT:  Negative for congestion and sore throat.    Eyes:  Positive for discharge (R eye) and redness (R eye). Negative for blurred vision, double vision, photophobia and pain.   Respiratory:  Negative for cough.    Musculoskeletal:  Negative for myalgias.       PAST MEDICAL HISTORY  Patient Active Problem List    Diagnosis Date Noted    Right ankle sprain 12/05/2022    Work related injury 12/05/2022    Allergic rhinitis 07/08/2021    IUD migration 03/10/2021    IUD strings lost 03/10/2021    Right hip pain 04/24/2019    Sacroiliac strain 02/06/2019    Strain of lumbar region 02/06/2019       SURGICAL HISTORY  patient denies any surgical history    ALLERGIES  No Known Allergies    CURRENT MEDICATIONS  Home Medications       Reviewed by Sunita Storm P.A.-C. (Physician Assistant) on 07/18/23 at 1025  Med List Status: <None>     Medication Last Dose Status   diclofenac sodium (VOLTAREN) 1 % Gel  Active                    SOCIAL HISTORY  Social History     Tobacco Use    Smoking status: Never    Smokeless tobacco: Never   Vaping Use    Vaping Use: Never used   Substance and Sexual Activity    Alcohol  "use: Yes     Comment: occ    Drug use: Never    Sexual activity: Not on file       FAMILY HISTORY  History reviewed. No pertinent family history.       Objective:     VITAL SIGNS: /80   Pulse (!) 119   Temp 36.5 °C (97.7 °F) (Temporal)   Resp 18   Ht 1.778 m (5' 10\")   Wt 109 kg (240 lb)   SpO2 97%   BMI 34.44 kg/m²     PHYSICAL EXAM  Physical Exam  Vitals reviewed.   Constitutional:       General: She is not in acute distress.     Appearance: Normal appearance. She is not ill-appearing or toxic-appearing.   HENT:      Head: Normocephalic and atraumatic.      Mouth/Throat:      Mouth: Mucous membranes are moist.   Eyes:      General:         Right eye: Discharge (Scant mucoid discharge) present.      Extraocular Movements: Extraocular movements intact.      Pupils: Pupils are equal, round, and reactive to light.      Comments: Right conjunctiva is injected.  Right upper and lower eyelid with mild swelling.  No erythema.  No pain with EOMs.   Cardiovascular:      Rate and Rhythm: Normal rate.   Pulmonary:      Effort: Pulmonary effort is normal. No respiratory distress.   Skin:     General: Skin is warm and dry.   Neurological:      General: No focal deficit present.      Mental Status: She is alert and oriented to person, place, and time.   Psychiatric:         Mood and Affect: Mood normal.         Assessment/Plan:     1. Other mucopurulent conjunctivitis of right eye  - ofloxacin (OCUFLOX) 0.3 % Solution; Administer 1 Drop into both eyes 4 times a day for 5 days.  Dispense: 5 mL; Refill: 0  -Warm or cool compresses 3 times daily for 5 minutes each for eyelid swelling  -Tylenol/ibuprofen OTC as needed  -Return to clinic if symptoms worsen or fail to resolve    MDM/Comments:  Patient has stable vital signs and is non-toxic appearing. Discussed supportive care with warm/cool compresses and use of antibiotic eye drops. Patient educated on hand hygiene and spread of conjunctivitis via touching surfaces.  " Patient will not use contact lenses until conjunctivitis completely resolves and antibiotic is completed.  Patient demonstrated understanding of treatment plan and will return to clinic if symptoms worsen in any way or fail to improve.     Differential diagnosis, natural history, supportive care, and indications for immediate follow-up discussed. All questions answered. Patient agrees with the plan of care.    Follow-up as needed if symptoms worsen or fail to improve to PCP, Urgent care or Emergency Room.    I have personally reviewed prior external notes and test results pertinent to today's visit.  I have independently reviewed and interpreted all diagnostics ordered during this urgent care acute visit.   Discussed management options (risks,benefits, and alternatives to treatment). Pt expresses understanding and the treatment plan was agreed upon. Questions were encouraged and answered to pt's satisfaction.    Please note that this dictation was created using voice recognition software. I have made a reasonable attempt to correct obvious errors, but I expect that there are errors of grammar and possibly content that I did not discover before finalizing the note.

## 2023-07-18 NOTE — LETTER
July 18, 2023    To Whom It May Concern:         This is confirmation that Dilan Pérez attended her scheduled appointment with Sunita Storm P.A.-C. on 7/18/23. Please excuse her absence from work today.          If you have any questions please do not hesitate to call me at the phone number listed below.    Sincerely,          Sunita Storm P.A.-C.  [phone]

## 2023-07-20 DIAGNOSIS — H10.021 OTHER MUCOPURULENT CONJUNCTIVITIS OF RIGHT EYE: ICD-10-CM

## 2023-07-20 RX ORDER — OFLOXACIN 3 MG/ML
1 SOLUTION/ DROPS OPHTHALMIC 4 TIMES DAILY
Qty: 5 ML | Refills: 0 | Status: SHIPPED | OUTPATIENT
Start: 2023-07-20 | End: 2023-07-20

## 2023-07-20 RX ORDER — OFLOXACIN 3 MG/ML
1 SOLUTION/ DROPS OPHTHALMIC 4 TIMES DAILY
Qty: 5 ML | Refills: 0 | Status: SHIPPED | OUTPATIENT
Start: 2023-07-20 | End: 2023-07-25

## 2023-07-20 NOTE — PROGRESS NOTES
Patient sent message reporting that eye redness and discharge has not improved with Polytrim eyedrops.  Prescription will be sent for ofloxacin eyedrops to pharmacy in [place] while patient is out of town.  Patient will return to the clinic if her symptoms worsen or fail to resolve.

## 2023-09-28 ENCOUNTER — NON-PROVIDER VISIT (OUTPATIENT)
Dept: URGENT CARE | Facility: PHYSICIAN GROUP | Age: 23
End: 2023-09-28

## 2023-09-28 DIAGNOSIS — Z11.1 PPD SCREENING TEST: Primary | ICD-10-CM

## 2023-09-28 PROCEDURE — 86580 TB INTRADERMAL TEST: CPT

## 2023-09-28 NOTE — PROGRESS NOTES
Dilan Pérez is a 23 y.o. female here for a non-provider visit for PPD placement -- Step 1 of 1    Reason for PPD:  work requirement    1. TB evaluation questionnaire completed by patient? Yes      -  If any answers marked yes did you contact a provider prior to placing? Not Indicated  2.  Patient notified to return to clinic for reading on: After Sat @ 1:44PM but before Sun @1:44PM  3.  PPD Placement documentation completed on TB evaluation questionnaire? Yes  4.  Location of TB evaluation questionnaire filed:  [facility]

## 2023-09-30 ENCOUNTER — NON-PROVIDER VISIT (OUTPATIENT)
Dept: URGENT CARE | Facility: PHYSICIAN GROUP | Age: 23
End: 2023-09-30

## 2023-09-30 LAB — TB WHEAL 3D P 5 TU DIAM: NORMAL MM

## 2023-09-30 NOTE — PROGRESS NOTES
Dilan Pérez is a 23 y.o. female here for a non-provider visit for PPD reading -- Step 1 of 1.      1.  Resulted in Epic under enter/edit results? Yes   2.  TB evaluation questionnaire scanned into chart and original given to patient?Yes      3. Was induration greater than 0 mm? No.        Routed to PCP? No

## 2023-10-16 RX ORDER — CIPROFLOXACIN HYDROCHLORIDE 3.5 MG/ML
SOLUTION/ DROPS TOPICAL
Qty: 1 ML | Refills: 0 | OUTPATIENT
Start: 2023-10-16

## 2023-10-16 RX ORDER — POLYMYXIN B SULFATE AND TRIMETHOPRIM 1; 10000 MG/ML; [USP'U]/ML
1 SOLUTION OPHTHALMIC EVERY 4 HOURS
Qty: 10 ML | Refills: 0 | OUTPATIENT
Start: 2023-10-16

## 2024-01-10 ENCOUNTER — OFFICE VISIT (OUTPATIENT)
Dept: URGENT CARE | Facility: PHYSICIAN GROUP | Age: 24
End: 2024-01-10
Payer: COMMERCIAL

## 2024-01-10 VITALS
RESPIRATION RATE: 18 BRPM | SYSTOLIC BLOOD PRESSURE: 122 MMHG | BODY MASS INDEX: 35.79 KG/M2 | DIASTOLIC BLOOD PRESSURE: 82 MMHG | WEIGHT: 250 LBS | OXYGEN SATURATION: 96 % | HEIGHT: 70 IN | HEART RATE: 100 BPM | TEMPERATURE: 98.4 F

## 2024-01-10 DIAGNOSIS — R05.1 ACUTE COUGH: ICD-10-CM

## 2024-01-10 DIAGNOSIS — R09.81 NASAL CONGESTION: ICD-10-CM

## 2024-01-10 DIAGNOSIS — J01.10 ACUTE NON-RECURRENT FRONTAL SINUSITIS: ICD-10-CM

## 2024-01-10 PROCEDURE — 3074F SYST BP LT 130 MM HG: CPT

## 2024-01-10 PROCEDURE — 3079F DIAST BP 80-89 MM HG: CPT

## 2024-01-10 PROCEDURE — 99213 OFFICE O/P EST LOW 20 MIN: CPT

## 2024-01-10 RX ORDER — AMOXICILLIN AND CLAVULANATE POTASSIUM 875; 125 MG/1; MG/1
1 TABLET, FILM COATED ORAL 2 TIMES DAILY
Qty: 10 TABLET | Refills: 0 | Status: SHIPPED | OUTPATIENT
Start: 2024-01-10 | End: 2024-01-15

## 2024-01-10 ASSESSMENT — ENCOUNTER SYMPTOMS
FEVER: 0
VOMITING: 0
CHILLS: 0
SHORTNESS OF BREATH: 0
NAUSEA: 0
SENSORY CHANGE: 0
SORE THROAT: 0
FOCAL WEAKNESS: 0
SINUS PAIN: 1
MYALGIAS: 1
CARDIOVASCULAR NEGATIVE: 1
HEADACHES: 1
COUGH: 1
ABDOMINAL PAIN: 0

## 2024-01-10 NOTE — PROGRESS NOTES
Chief Complaint   Patient presents with    Cough     Congestion, sore throat, fever, sinus pain x 3 weeks        HISTORY OF PRESENT ILLNESS: Patient is a pleasant 23 y.o. female who presents to urgent care today ongoing cold-like symptoms for the last 3 weeks despite the use of prednisone, Tessalon and Flonase.  Patient was seen by telehealth but continues to be ill.  She denies any fevers.  Does not smoke, no previous history related otherwise.    Patient Active Problem List    Diagnosis Date Noted    Right ankle sprain 12/05/2022    Work related injury 12/05/2022    Allergic rhinitis 07/08/2021    IUD migration 03/10/2021    IUD strings lost 03/10/2021    Right hip pain 04/24/2019    Sacroiliac strain 02/06/2019    Strain of lumbar region 02/06/2019       Allergies:Patient has no known allergies.    Current Outpatient Medications Ordered in Epic   Medication Sig Dispense Refill    amoxicillin-clavulanate (AUGMENTIN) 875-125 MG Tab Take 1 Tablet by mouth 2 times a day for 5 days. 10 Tablet 0     No current Epic-ordered facility-administered medications on file.       Past Medical History:   Diagnosis Date    Anxiety     on meds    Depression     on meds       Social History     Tobacco Use    Smoking status: Never    Smokeless tobacco: Never   Vaping Use    Vaping Use: Never used   Substance Use Topics    Alcohol use: Yes     Comment: occ    Drug use: Never       No family status information on file.   History reviewed. No pertinent family history.    Review of Systems   Constitutional:  Negative for chills, fever and malaise/fatigue.   HENT:  Positive for congestion and sinus pain. Negative for sore throat.    Respiratory:  Positive for cough. Negative for shortness of breath.    Cardiovascular: Negative.    Gastrointestinal:  Negative for abdominal pain, nausea and vomiting.   Musculoskeletal:  Positive for myalgias.   Neurological:  Positive for headaches. Negative for sensory change and focal weakness.  "      Exam:  /82   Pulse 100   Temp 36.9 °C (98.4 °F) (Temporal)   Resp 18   Ht 1.778 m (5' 10\")   Wt 113 kg (250 lb)   SpO2 96%   Physical Exam  Constitutional:       Appearance: Normal appearance. She is normal weight. She is not toxic-appearing.   HENT:      Head: Normocephalic.      Right Ear: Tympanic membrane, ear canal and external ear normal. There is no impacted cerumen.      Left Ear: Tympanic membrane, ear canal and external ear normal. There is no impacted cerumen.      Nose: Congestion and rhinorrhea present. No nasal tenderness or mucosal edema. Rhinorrhea is clear.      Right Sinus: Frontal sinus tenderness present.      Left Sinus: Frontal sinus tenderness present.      Mouth/Throat:      Mouth: Mucous membranes are moist.      Pharynx: Posterior oropharyngeal erythema present. No oropharyngeal exudate.      Tonsils: No tonsillar exudate. 1+ on the right. 1+ on the left.   Eyes:      General:         Right eye: No discharge.         Left eye: No discharge.      Extraocular Movements: Extraocular movements intact.      Conjunctiva/sclera: Conjunctivae normal.      Pupils: Pupils are equal, round, and reactive to light.   Cardiovascular:      Rate and Rhythm: Normal rate and regular rhythm.      Pulses: Normal pulses.      Heart sounds: Normal heart sounds. No murmur heard.  Pulmonary:      Effort: Pulmonary effort is normal. No respiratory distress.      Breath sounds: Normal breath sounds. No stridor. No wheezing, rhonchi or rales.   Musculoskeletal:         General: No swelling, tenderness, deformity or signs of injury.      Cervical back: Normal range of motion and neck supple. No tenderness.      Right lower leg: No edema.      Left lower leg: No edema.   Skin:     General: Skin is warm and dry.      Capillary Refill: Capillary refill takes less than 2 seconds.      Findings: No bruising, erythema, lesion or rash.   Neurological:      General: No focal deficit present.      Mental " Status: She is alert.      Sensory: No sensory deficit.      Motor: No weakness.      Coordination: Coordination normal.      Gait: Gait normal.   Psychiatric:         Mood and Affect: Mood normal.         Behavior: Behavior normal.         Thought Content: Thought content normal.         Judgment: Judgment normal.         Assessment/Plan:  1. Acute non-recurrent frontal sinusitis  - amoxicillin-clavulanate (AUGMENTIN) 875-125 MG Tab; Take 1 Tablet by mouth 2 times a day for 5 days.  Dispense: 10 Tablet; Refill: 0    2. Acute cough    3. Nasal congestion    Based on physical exam along with review of systems I do think the patient likely has developed a sinus infection.  Patient placed on Augmentin, she currently has Flonase at home, encouraged her to continue to use this.  Advised Motrin or Tylenol for any ongoing discomfort.  Medication to be taken with food, drink plenty of fluids.  Advise she follow-up if she continues to get worse or does not improve.    Supportive care, differential diagnoses, and indications for immediate follow-up discussed with patient.   Pathogenesis of diagnosis discussed including typical length and natural progression.   Instructed to return to clinic or nearest emergency department for any change in condition, further concerns, or worsening of symptoms.  Patient states understanding of the plan of care and discharge instructions.  Instructed to make an appointment, for follow up, with primary care provider.      Please note that this dictation was created using voice recognition software. I have made every reasonable attempt to correct obvious errors, but I expect that there are errors of grammar and possibly content that I did not discover before finalizing the note.      Magaly QUEZADA

## 2024-01-18 ENCOUNTER — OFFICE VISIT (OUTPATIENT)
Dept: URGENT CARE | Facility: PHYSICIAN GROUP | Age: 24
End: 2024-01-18
Payer: COMMERCIAL

## 2024-01-18 VITALS
WEIGHT: 276 LBS | RESPIRATION RATE: 18 BRPM | SYSTOLIC BLOOD PRESSURE: 124 MMHG | TEMPERATURE: 97.9 F | DIASTOLIC BLOOD PRESSURE: 82 MMHG | HEART RATE: 99 BPM | BODY MASS INDEX: 39.51 KG/M2 | OXYGEN SATURATION: 97 % | HEIGHT: 70 IN

## 2024-01-18 DIAGNOSIS — S39.012A STRAIN OF LUMBAR REGION, INITIAL ENCOUNTER: ICD-10-CM

## 2024-01-18 PROCEDURE — 3079F DIAST BP 80-89 MM HG: CPT | Performed by: FAMILY MEDICINE

## 2024-01-18 PROCEDURE — 3074F SYST BP LT 130 MM HG: CPT | Performed by: FAMILY MEDICINE

## 2024-01-18 PROCEDURE — 99213 OFFICE O/P EST LOW 20 MIN: CPT | Performed by: FAMILY MEDICINE

## 2024-01-18 RX ORDER — CYCLOBENZAPRINE HCL 5 MG
5-10 TABLET ORAL EVERY EVENING
Qty: 20 TABLET | Refills: 0 | Status: SHIPPED | OUTPATIENT
Start: 2024-01-18

## 2024-01-18 ASSESSMENT — ENCOUNTER SYMPTOMS
FEVER: 0
BACK PAIN: 1

## 2024-01-18 NOTE — PROGRESS NOTES
"Subjective:     Dilan Pérez is a 23 y.o. female who presents for Cough (Lower right side of back, started a couple weeks ago with cough, has now become constant, achy /)    HPI  Pt presents for evaluation of an acute problem  Pt with right lower back pain the past few weeks   Was recently ill with a bad cough and pain slowly worsened throughout the course of the illness   Pain started only when coughing   Now a constant ache   Pain is worse with ambulation   No numbness/tingling   No radiation down the leg   Has hx of lower back problems, but usually bilaterally     Review of Systems   Constitutional:  Negative for fever.   Musculoskeletal:  Positive for back pain.   Skin:  Negative for rash.       PMH:  has a past medical history of Anxiety and Depression.  MEDS:   Current Outpatient Medications:     cyclobenzaprine (FLEXERIL) 5 mg tablet, Take 1-2 Tablets by mouth every evening., Disp: 20 Tablet, Rfl: 0  ALLERGIES: No Known Allergies  SURGHX: History reviewed. No pertinent surgical history.  SOCHX:  reports that she has never smoked. She has never used smokeless tobacco. She reports current alcohol use. She reports that she does not use drugs.     Objective:   /82   Pulse 99   Temp 36.6 °C (97.9 °F) (Temporal)   Resp 18   Ht 1.778 m (5' 10\")   Wt (!) 125 kg (276 lb)   SpO2 97%   BMI 39.60 kg/m²     Physical Exam  Constitutional:       General: She is not in acute distress.     Appearance: She is well-developed. She is not diaphoretic.   Pulmonary:      Effort: Pulmonary effort is normal.   Neurological:      Mental Status: She is alert.     Back:  General: No asymmetry, bruising, or erythema appreciated  ROM: flexion 70°, extension 0°, lateral bend 30°, lateral twist 30°  Palpation: No tenderness to palpation of spinous processes, no step-offs appreciated, +TTP along right paraspinal muscles  Strength: 5/5 hip flexion/extension  Special tests: Straight leg raise -   Neuro: Sensation intact "     Assessment/Plan:   Assessment    1. Strain of lumbar region, initial encounter  - cyclobenzaprine (FLEXERIL) 5 mg tablet; Take 1-2 Tablets by mouth every evening.  Dispense: 20 Tablet; Refill: 0    Patient with lumbar strain.  No sign of radiculopathy or nerve impingement at this time.  Recommended supportive care measures and reviewed importance of stretches, exercises, heat, and continued activity.  Given Flexeril for nighttime use to improve sleep and may use ibuprofen or other NSAID during the day.  Follow-up in the urgent care as needed.

## 2024-01-18 NOTE — LETTER
January 18, 2024    To Whom It May Concern:         This is confirmation that Dilan Pérez attended her scheduled appointment with Andre Eubnaks M.D. on 1/18/24.  Please excuse her from missing work today.  She has been advised not to work solo for the next one week so she does not cause a repeat injury to her back.  Thank you for making accommodations as she recovers.            If you have any questions please do not hesitate to call me at the phone number listed below.    Sincerely,          Andre Eubanks M.D.  [phone]